# Patient Record
Sex: MALE | Race: WHITE | NOT HISPANIC OR LATINO | URBAN - METROPOLITAN AREA
[De-identification: names, ages, dates, MRNs, and addresses within clinical notes are randomized per-mention and may not be internally consistent; named-entity substitution may affect disease eponyms.]

---

## 2017-11-30 ENCOUNTER — ALLSCRIPTS OFFICE VISIT (OUTPATIENT)
Dept: OTHER | Facility: OTHER | Age: 57
End: 2017-11-30

## 2017-11-30 ENCOUNTER — TRANSCRIBE ORDERS (OUTPATIENT)
Dept: ADMINISTRATIVE | Facility: HOSPITAL | Age: 57
End: 2017-11-30

## 2017-11-30 DIAGNOSIS — R06.89 HYPOVENTILATION, IDIOPATHIC: Primary | ICD-10-CM

## 2017-11-30 DIAGNOSIS — R06.09 OTHER FORMS OF DYSPNEA: ICD-10-CM

## 2017-12-05 NOTE — CONSULTS
Assessment    1  Hyperlipidemia (272 4) (E78 5)   2  Palpitation (785 1) (R00 2)   3  Dyspnea on effort (786 09) (R06 09)    Plan  Dyspnea on effort    · ECHO COMPLETE WITH CONTRAST IF INDICATED; Status:Need Information - Financial  Authorization; Requested for:30Nov2017;    Perform:[facility]; [phone]; Ordered; For:Dyspnea on effort; Ordered By:Tim Gonzalez;   · STRESS TEST ONLY, EXERCISE; Status:Hold For - Scheduling; Requested  for:30Nov2017;    Perform:[facility]; [phone]; Ordered; For:Dyspnea on effort; Ordered By:Valeriy Gonzalez;   · Follow-up visit in 5 months Evaluation and Treatment  Follow-up  Status: Hold For -  Scheduling  Requested for: [id number]   Ordered; For: Dyspnea on effort; Ordered By: Leann Castorena Performed:  Due: [id number]    Discussion/Summary    1  FLORSE- this may be due to weight gain and deconditioning  FAther had bicuspid aortic valve likely due to AVR at 46 but he has no murmur of such  Regular stress test to evaluate for anginal equivalent     Chief Complaint  New pt  Pt came in today to rule out if he needs aortic heart valve replacement just like his father  Pt has sob when being active (running)  History of Present Illness  Cardiology HPI Free Text Note Form [facility]: 60 yo male presents presents for evaluation as his father had AVR in his 52's  He thinks it may have been due to bicuspid aortic valve  He has noticed SOB on some heavier exertion  He says this has gotten worse  He does run- usually around 3 miles  More recently he runs a mile and he is short of breath  He also does gymnastics  He did recently gain around 20 lbs- he says he should be under 200  He denies chest pressure  Review of Systems      Cardiac: No complaints of chest pain, no palpitations, no fainiting  Skin: No complaints of nonhealing sores or skin rash     Genitourinary: No complaints of recurrent urinary tract infections, frequent urination at night, difficult urination, blood in urine, kidney stones, loss of bladder control, no kidney or prostate problems, no erectile dysfunction  Psychological: No complaints of feeling depressed, anxiety, panic attacks, or difficulty concentrating  General: No complaints of trouble sleeping, lack of energy, fatigue, appetite changes, weight changes, fever, frequent infections, or night sweats  Respiratory: shortness of breath  HEENT: No complaints of serious problems, hearing problems, nose problems, throat problems, or snoring  Gastrointestinal: No complaints of liver problems, nausea, vomiting, heartburn, constipation, bloody stools, diarrhea, problems swallowing, adbominal pain, or rectal bleeding  Hematologic: No complaints of bleeding disorders, anemia, blood clots, or excessive brusing  Neurological: No complaints of numbness, tingling, dizziness, weakness, seizures, headaches, syncope or fainting, AM fatigue, daytime sleepiness, no witnessed apnea episodes  Musculoskeletal: No complaints of arthritis, back pain, or painfull swelling  Active Problems    1  Anxiety (300 00) (F41 9)   2  Elevated blood-pressure reading without diagnosis of hypertension (796 2) (R03 0)   3  H/o Lyme disease (V12 09) (Z86 19)   4  Hyperlipidemia (272 4) (E78 5)   5  Nephrolithiasis (592 0) (N20 0)   6  Vitamin D deficiency (268 9) (E55 9)    Past Medical History    · History of Elevated pancreatic enzyme (790 5) (R74 8)   · History of Encounter for screening for malignant neoplasm of colon (V76 51) (Z12 11)    The active problems and past medical history were reviewed and updated today  Surgical History    · History of Complex Repair Of Wound Chin   · History of Tonsillectomy    The surgical history was reviewed and updated today         Family History  Father    · Family history of Adenocarcinoma of prostate   · Family history of Heart valve replaced  Paternal Grandmother    · Family history of glaucoma (V19 11) (S76 431)  Paternal Grandfather    · Family history of cardiac disorder (V17 49) (Z82 49)   · Family history of diabetes mellitus (V18 0) (Z83 3)   · Family history of malignant neoplasm of breast (V16 3) (Z80 3)  Family History Reviewed: The family history was reviewed and updated today  Social History    ·    · No illicit drug use   · Non-smoker (V49 89) (Z78 9)   · Social alcohol use (Z78 9)  The social history was reviewed and updated today  Current Meds    The medication list was reviewed and updated today  Vitals  Signs    Heart Rate: 75  Systolic: 653, LUE, Sitting  Diastolic: 80, LUE, Sitting  Height: 5 ft 8 in  Weight: 218 lb   BMI Calculated: 33 15  BSA Calculated: 2 12  O2 Saturation: 98    Physical Exam    Constitutional   General appearance: No acute distress, well appearing and well nourished  Eyes   Conjunctiva and Sclera examination: Conjunctiva pink, sclera anicteric  Ears, Nose, Mouth, and Throat - Oropharynx: Clear, nares are clear, mucous membranes are moist    Neck   Neck and thyroid: Normal, supple, trachea midline, no thyromegaly  Pulmonary   Respiratory effort: No increased work of breathing or signs of respiratory distress  Auscultation of lungs: Clear to auscultation, no rales, no rhonchi, no wheezing, good air movement  Cardiovascular   Auscultation of heart: Normal rate and rhythm, normal S1 and S2, no murmurs  Carotid pulses: Normal, 2+ bilaterally  Peripheral vascular exam: Normal pulses throughout, no tenderness, erythema or swelling  Pedal pulses: Normal, 2+ bilaterally  Examination of extremities for edema and/or varicosities: Normal     Abdomen   Abdomen: Non-tender and no distention  Liver and spleen: No hepatomegaly or splenomegaly  Musculoskeletal Gait and station: Normal gait  Digits and nails: Normal without clubbing or cyanosis   Inspection/palpation of joints, bones, and muscles: Normal, ROM normal     Skin - Skin and subcutaneous tissue: Normal without rashes or lesions  Skin is warm and well perfused, normal turgor  Neurologic - Cranial nerves: II - XII intact   Speech: Normal     Psychiatric - Orientation to person, place, and time: Normal  Mood and affect: Normal       Results/Data    Lab Review: June 2017 labs- normal Cr, K, HgB   A 12 lead ECG was performed and was normal       Signatures   Electronically signed by : Bebe Wheeler DO; Nov 30 2017  4:53PM EST                       (Author)

## 2017-12-11 ENCOUNTER — HOSPITAL ENCOUNTER (OUTPATIENT)
Dept: NON INVASIVE DIAGNOSTICS | Facility: CLINIC | Age: 57
Discharge: HOME | End: 2017-12-11
Payer: COMMERCIAL

## 2017-12-11 ENCOUNTER — GENERIC CONVERSION - ENCOUNTER (OUTPATIENT)
Dept: CARDIOLOGY CLINIC | Facility: CLINIC | Age: 57
End: 2017-12-11

## 2017-12-11 DIAGNOSIS — R06.09 OTHER FORMS OF DYSPNEA: ICD-10-CM

## 2017-12-11 PROCEDURE — 93306 TTE W/DOPPLER COMPLETE: CPT

## 2017-12-11 PROCEDURE — 93017 CV STRESS TEST TRACING ONLY: CPT

## 2017-12-13 LAB
CHEST PAIN STATEMENT: NORMAL
MAX DIASTOLIC BP: 100 MMHG
MAX HEART RATE: 173 BPM
MAX PREDICTED HEART RATE: 162 BPM
MAX. SYSTOLIC BP: 210 MMHG
PROTOCOL NAME: NORMAL
REASON FOR TERMINATION: NORMAL
TARGET HR FORMULA: NORMAL
TEST INDICATION: NORMAL
TIME IN EXERCISE PHASE: NORMAL

## 2018-01-14 VITALS
HEART RATE: 75 BPM | SYSTOLIC BLOOD PRESSURE: 118 MMHG | BODY MASS INDEX: 33.04 KG/M2 | WEIGHT: 218 LBS | DIASTOLIC BLOOD PRESSURE: 80 MMHG | HEIGHT: 68 IN | OXYGEN SATURATION: 98 %

## 2018-03-22 ENCOUNTER — OFFICE VISIT (OUTPATIENT)
Dept: CARDIOLOGY CLINIC | Facility: CLINIC | Age: 58
End: 2018-03-22
Payer: COMMERCIAL

## 2018-03-22 VITALS
DIASTOLIC BLOOD PRESSURE: 82 MMHG | SYSTOLIC BLOOD PRESSURE: 128 MMHG | HEIGHT: 68 IN | WEIGHT: 213 LBS | BODY MASS INDEX: 32.28 KG/M2 | HEART RATE: 80 BPM | OXYGEN SATURATION: 98 %

## 2018-03-22 DIAGNOSIS — I10 HTN (HYPERTENSION), BENIGN: ICD-10-CM

## 2018-03-22 DIAGNOSIS — R07.9 CHEST PAIN, UNSPECIFIED TYPE: Primary | ICD-10-CM

## 2018-03-22 PROBLEM — R06.00 DYSPNEA ON EXERTION: Status: ACTIVE | Noted: 2018-03-22

## 2018-03-22 PROCEDURE — 93000 ELECTROCARDIOGRAM COMPLETE: CPT | Performed by: INTERNAL MEDICINE

## 2018-03-22 PROCEDURE — 99214 OFFICE O/P EST MOD 30 MIN: CPT | Performed by: INTERNAL MEDICINE

## 2018-03-22 NOTE — PROGRESS NOTES
Cardiology Outpatient Progress Note - Ana Contreras 60 y o  male MRN: [id number]    @ Encounter: [id number]      There are no active problems to display for this patient  Assessment:  1  FLORES- this may be due to weight gain and deconditioning  FAther had bicuspid aortic valve likely due to AVR at 45 but he has no murmur of such  12/11/17: normal stress test 12 minutes- SBP up to 210 mmHg  12/11/17: EF: 60%, grade 1 DD, trileaflet aortic valve  EKG today: NSR at 80 bpm   2  HTN response on stress test- 210/100 mmHg    TODAY'S PLAN:  His chest pressure may be related to HTN  Given his new exertional chest pressure, will get nuclear stress    HPI:   60 yo male presents presents for evaluation as his father had AVR in his 52's  He thinks it may have been due to bicuspid aortic valve  He has noticed SOB on some heavier exertion  He says this has gotten worse  He does run- usually around 3 miles  More recently he runs a mile and he is short of breath  He also does gymnastics  He did recently gain around 20 lbs- he says he should be under 200  He denies chest pressure  Interval History:    He is presenting today with chief complaint of chest pains  He noticed that he is getting more short of breath climbing the stairs  He also states that when he climbs stairs he gets some pressure  No past medical history on file  Review of Systems - Negative except chest pains as described  No Known Allergies    No current outpatient prescriptions on file  Social History     Social History    Marital status: Single     Spouse name: N/A    Number of children: N/A    Years of education: N/A     Occupational History    Not on file       Social History Main Topics    Smoking status: Never Smoker    Smokeless tobacco: Never Used    Alcohol use Not on file    Drug use: Unknown    Sexual activity: Not on file     Other Topics Concern    Not on file     Social History Narrative    No narrative on file Family Hx- no premature CAD    Physical Exam:    Vitals: Blood pressure 128/82, pulse 80, height 5' 8" (1 727 m), weight 96 6 kg (213 lb), SpO2 98 %  , Body mass index is 32 39 kg/m² ,   Wt Readings from Last 3 Encounters:   03/22/18 96 6 kg (213 lb)   11/30/17 98 9 kg (218 lb)       Physical Exam:    GEN: Dwaine Butt appears well, alert and oriented x 3, pleasant and cooperative   HEENT: pupils equal, round, and reactive to light; extraocular muscles intact  NECK: supple, no carotid bruits   HEART: regular rhythm, normal S1 and S2, no murmurs, clicks, gallops or rubs, JVP is  normal  LUNGS: clear to auscultation bilaterally; no wheezes, rales, or rhonchi   ABDOMEN: normal bowel sounds, soft, no tenderness, no distention  EXTREMITIES: peripheral pulses normal; no clubbing, cyanosis, or edema  NEURO: no focal findings   SKIN: normal without suspicious lesions on exposed skin    Labs & Results:    No results found for: GLUCOSE, CALCIUM, NA, K, CO2, CL, BUN, CREATININE  No results found for: WBC, HGB, HCT, MCV, PLT  No results found for: BNP   No results found for: CHOL  No results found for: HDL  No results found for: LDLCALC  No results found for: TRIG  No components found for: CHOLHDL      EKG personally reviewed by Michael Santamaria DO  Counseling / Coordination of Care  Total floor / unit time spent today 25 minutes  Greater than 50% of total time was spent with the patient and / or family counseling and / or coordination of care  A description of the counseling / coordination of care: 15  Thank you for the opportunity to participate in the care of this patient    FELICIANO BOWER 29 [facility] [place]

## 2018-03-26 ENCOUNTER — APPOINTMENT (OUTPATIENT)
Dept: LAB | Age: 58
End: 2018-03-26
Payer: COMMERCIAL

## 2018-03-26 ENCOUNTER — OFFICE VISIT (OUTPATIENT)
Dept: CARDIOLOGY CLINIC | Facility: CLINIC | Age: 58
End: 2018-03-26

## 2018-03-26 ENCOUNTER — HOSPITAL ENCOUNTER (OUTPATIENT)
Dept: NON INVASIVE DIAGNOSTICS | Facility: CLINIC | Age: 58
Discharge: HOME | End: 2018-03-26
Payer: COMMERCIAL

## 2018-03-26 ENCOUNTER — TRANSCRIBE ORDERS (OUTPATIENT)
Dept: ADMINISTRATIVE | Age: 58
End: 2018-03-26

## 2018-03-26 VITALS
BODY MASS INDEX: 32.28 KG/M2 | HEIGHT: 68 IN | WEIGHT: 213 LBS | HEART RATE: 80 BPM | SYSTOLIC BLOOD PRESSURE: 120 MMHG | DIASTOLIC BLOOD PRESSURE: 76 MMHG

## 2018-03-26 DIAGNOSIS — R94.39 ABNORMAL CARDIOVASCULAR STRESS TEST: Primary | ICD-10-CM

## 2018-03-26 DIAGNOSIS — I10 HTN (HYPERTENSION), BENIGN: ICD-10-CM

## 2018-03-26 DIAGNOSIS — R07.9 CHEST PAIN, UNSPECIFIED TYPE: ICD-10-CM

## 2018-03-26 DIAGNOSIS — I10 ESSENTIAL HYPERTENSION, MALIGNANT: Primary | ICD-10-CM

## 2018-03-26 DIAGNOSIS — I10 ESSENTIAL HYPERTENSION: Primary | ICD-10-CM

## 2018-03-26 DIAGNOSIS — I10 HYPERTENSION, UNSPECIFIED TYPE: ICD-10-CM

## 2018-03-26 DIAGNOSIS — I10 ESSENTIAL HYPERTENSION, MALIGNANT: ICD-10-CM

## 2018-03-26 LAB
ANION GAP SERPL CALCULATED.3IONS-SCNC: 4 MMOL/L (ref 4–13)
BUN SERPL-MCNC: 12 MG/DL (ref 5–25)
CALCIUM SERPL-MCNC: 9.2 MG/DL (ref 8.3–10.1)
CHLORIDE SERPL-SCNC: 107 MMOL/L (ref 100–108)
CO2 SERPL-SCNC: 28 MMOL/L (ref 21–32)
CREAT SERPL-MCNC: 0.83 MG/DL (ref 0.6–1.3)
GFR SERPL CREATININE-BSD FRML MDRD: 96 ML/MIN/1.73SQ M
GLUCOSE SERPL-MCNC: 133 MG/DL (ref 65–140)
MAX DIASTOLIC BP: 100 MMHG
MAX HEART RATE: 144 BPM
MAX PREDICTED HEART RATE: 161 BPM
MAX. SYSTOLIC BP: 192 MMHG
POTASSIUM SERPL-SCNC: 3.9 MMOL/L (ref 3.5–5.3)
PROTOCOL NAME: NORMAL
REASON FOR TERMINATION: NORMAL
SODIUM SERPL-SCNC: 139 MMOL/L (ref 136–145)
TARGET HR FORMULA: NORMAL
TEST INDICATION: NORMAL
TIME IN EXERCISE PHASE: NORMAL

## 2018-03-26 PROCEDURE — 78452 HT MUSCLE IMAGE SPECT MULT: CPT

## 2018-03-26 PROCEDURE — 99213 OFFICE O/P EST LOW 20 MIN: CPT | Performed by: INTERNAL MEDICINE

## 2018-03-26 PROCEDURE — A9502 TC99M TETROFOSMIN: HCPCS

## 2018-03-26 PROCEDURE — 93017 CV STRESS TEST TRACING ONLY: CPT

## 2018-03-26 PROCEDURE — 80048 BASIC METABOLIC PNL TOTAL CA: CPT

## 2018-03-26 PROCEDURE — 36415 COLL VENOUS BLD VENIPUNCTURE: CPT

## 2018-03-26 NOTE — PROGRESS NOTES
Cardiology Outpatient Progress Note - Francisco Driver 60 y o  male MRN: [id number]    @ Encounter: [id number]      Patient Active Problem List    Diagnosis Date Noted    HTN (hypertension), benign 03/22/2018    Dyspnea on exertion 03/22/2018       Assessment:  1  Abnormal nuclear stress test with ST depression and reversible perfusion defect in anteroseptum today 3/26/18  12/11/17: normal stress test 12 minutes- SBP up to 210 mmHg  12/11/17: EF: 60%, grade 1 DD, trileaflet aortic valve  2  HTN response on stress test- 210/100 mmHg    TODAY'S PLAN:  [facility] given his exertional symptoms and positive nuclear stress test  Interesting given his recent stress test Dec doing 12 minutes with no ischemic changes  HPI:   60 yo male presents presents for evaluation as his father had AVR in his 52's  He thinks it may have been due to bicuspid aortic valve  He has noticed SOB on some heavier exertion  He says this has gotten worse  He does run- usually around 3 miles  More recently he runs a mile and he is short of breath  He also does gymnastics  He did recently gain around 20 lbs- he says he should be under 200  He followed up 3/22 for exertional chest pressure  Interval History:   I am seeing him today with his stress test today and showed ST depression and reversible perfusion defect in the septum, anterior wall  No past medical history on file  Review of Systems - Negative except exertional chest pressure    No Known Allergies    No current outpatient prescriptions on file  Social History     Social History    Marital status: Single     Spouse name: N/A    Number of children: N/A    Years of education: N/A     Occupational History    Not on file       Social History Main Topics    Smoking status: Never Smoker    Smokeless tobacco: Never Used    Alcohol use 0 6 - 1 2 oz/week     1 - 2 Glasses of wine per week      Comment: occasional    Drug use: No    Sexual activity: Not on file     Other Topics Concern    Not on file     Social History Narrative    No narrative on file       No family history on file  Physical Exam:    Vitals: Blood pressure 120/76, pulse 80, height 5' 8" (1 727 m), weight 96 6 kg (213 lb)  , Body mass index is 32 39 kg/m² ,   Wt Readings from Last 3 Encounters:   03/26/18 96 6 kg (213 lb)   03/26/18 96 6 kg (213 lb)   03/22/18 96 6 kg (213 lb)         Physical Exam:    GEN: Jay Najera appears well, alert and oriented x 3, pleasant and cooperative   HEENT: pupils equal, round, and reactive to light; extraocular muscles intact  NECK: supple, no carotid bruits   HEART: regular rhythm, normal S1 and S2, no murmurs, clicks, gallops or rubs, JVP is    LUNGS: clear to auscultation bilaterally; no wheezes, rales, or rhonchi   ABDOMEN: normal bowel sounds, soft, no tenderness, no distention  EXTREMITIES: peripheral pulses normal; no clubbing, cyanosis, or edema  NEURO: no focal findings   SKIN: normal without suspicious lesions on exposed skin    Labs & Results:    No results found for: GLUCOSE, CALCIUM, NA, K, CO2, CL, BUN, CREATININE  No results found for: WBC, HGB, HCT, MCV, PLT  No results found for: BNP   No results found for: CHOL  No results found for: HDL  No results found for: LDLCALC  No results found for: TRIG  No components found for: CHOLHDL      EKG personally reviewed by James Morris DO  Counseling / Coordination of Care  Total floor / unit time spent today 25 minutes  Greater than 50% of total time was spent with the patient and / or family counseling and / or coordination of care  A description of the counseling / coordination of care: 15  Thank you for the opportunity to participate in the care of this patient    FELICIANO BOWER 29 [address] [place]

## 2018-03-28 PROBLEM — Z12.11 SCREENING FOR COLON CANCER: Status: ACTIVE | Noted: 2018-03-28

## 2018-03-28 RX ORDER — SODIUM CHLORIDE 9 MG/ML
125 INJECTION, SOLUTION INTRAVENOUS CONTINUOUS
Status: CANCELLED | OUTPATIENT
Start: 2018-03-28

## 2018-03-28 RX ORDER — ASPIRIN 81 MG/1
324 TABLET, CHEWABLE ORAL ONCE
Status: CANCELLED | OUTPATIENT
Start: 2018-03-28 | End: 2018-03-28

## 2018-03-28 NOTE — H&P
H&P Exam - Cardiology   Marie Gan 60 y o  male MRN: [id number]  Unit/Bed#:  Encounter: [id number]      Assessment  1  Exertional angina  2  Abnormal exercise SPECT        Plan    Maria L El will undergo an elective coronary angiography in view of findings of large area of LAD territory ischemia on recent exercise SPECT testing  No piror hx of CAD  Risks and benefits of cardiac catheterization were explained to patient  Informed consent was obtained and placed in the chart  History of Present Illness   HPI:  Marie Gan is a 60 y o  male who presents for an elective coronary angiography following an abnormal exercise stress test    He has experienced exertional angina and dyspnea  + recent weight gain  Hypertensive response during stress test          Historical Information     PMH -CAD, - HTN, -DM    History   Alcohol Use    0 6 - 1 2 oz/week    1 - 2 Glasses of wine per week     Comment: occasional     History   Drug Use No     History   Smoking Status    Never Smoker   Smokeless Tobacco    Never Used     Family History: No family history on file  Meds/Allergies     No Known Allergies    Objective   Vitals: There were no vitals taken for this visit  Review of Systems: Pertinent + are in H+P    Physical Exam   Constitutional: He is oriented to person, place, and time  He appears well-developed  HENT:   Head: Normocephalic  Eyes: Pupils are equal, round, and reactive to light  Neck: No JVD present  Cardiovascular: Normal rate, regular rhythm, normal heart sounds and intact distal pulses  Pulmonary/Chest: Effort normal and breath sounds normal    Abdominal: Soft  Bowel sounds are normal    Neurological: He is alert and oriented to person, place, and time  Skin: Skin is warm  Lab Results: I have personally reviewed pertinent lab results  Imaging: I have personally reviewed pertinent reports

## 2018-03-29 ENCOUNTER — HOSPITAL ENCOUNTER (OUTPATIENT)
Dept: NON INVASIVE DIAGNOSTICS | Facility: HOSPITAL | Age: 58
Discharge: HOME | End: 2018-03-30
Attending: INTERNAL MEDICINE | Admitting: INTERNAL MEDICINE
Payer: COMMERCIAL

## 2018-03-29 DIAGNOSIS — R94.39 ABNORMAL CARDIOVASCULAR STRESS TEST: ICD-10-CM

## 2018-03-29 DIAGNOSIS — I25.10 CAD (CORONARY ARTERY DISEASE): Primary | ICD-10-CM

## 2018-03-29 LAB
ANION GAP SERPL CALCULATED.3IONS-SCNC: 6 MMOL/L (ref 4–13)
APTT PPP: 31 SECONDS (ref 23–35)
ATRIAL RATE: 69 BPM
BUN SERPL-MCNC: 10 MG/DL (ref 5–25)
CALCIUM SERPL-MCNC: 9.4 MG/DL (ref 8.3–10.1)
CHLORIDE SERPL-SCNC: 110 MMOL/L (ref 100–108)
CHOLEST SERPL-MCNC: 210 MG/DL (ref 50–200)
CHOLEST SERPL-MCNC: 219 MG/DL (ref 50–200)
CO2 SERPL-SCNC: 25 MMOL/L (ref 21–32)
CREAT SERPL-MCNC: 0.89 MG/DL (ref 0.6–1.3)
ERYTHROCYTE [DISTWIDTH] IN BLOOD BY AUTOMATED COUNT: 13.2 % (ref 11.6–15.1)
GFR SERPL CREATININE-BSD FRML MDRD: 94 ML/MIN/1.73SQ M
GLUCOSE P FAST SERPL-MCNC: 97 MG/DL (ref 65–99)
GLUCOSE SERPL-MCNC: 97 MG/DL (ref 65–140)
HCT VFR BLD AUTO: 45.9 % (ref 36.5–49.3)
HDLC SERPL-MCNC: 36 MG/DL (ref 40–60)
HDLC SERPL-MCNC: 38 MG/DL (ref 40–60)
HGB BLD-MCNC: 16.3 G/DL (ref 12–17)
INR PPP: 0.95 (ref 0.86–1.16)
KCT BLD-ACNC: 278 SEC (ref 89–137)
LDLC SERPL CALC-MCNC: 141 MG/DL (ref 0–100)
LDLC SERPL CALC-MCNC: 148 MG/DL (ref 0–100)
MCH RBC QN AUTO: 30.5 PG (ref 26.8–34.3)
MCHC RBC AUTO-ENTMCNC: 35.5 G/DL (ref 31.4–37.4)
MCV RBC AUTO: 86 FL (ref 82–98)
P AXIS: 14 DEGREES
PLATELET # BLD AUTO: 196 THOUSANDS/UL (ref 149–390)
PMV BLD AUTO: 9.5 FL (ref 8.9–12.7)
POTASSIUM SERPL-SCNC: 3.8 MMOL/L (ref 3.5–5.3)
PR INTERVAL: 172 MS
PROTHROMBIN TIME: 12.7 SECONDS (ref 12.1–14.4)
QRS AXIS: 12 DEGREES
QRSD INTERVAL: 92 MS
QT INTERVAL: 386 MS
QTC INTERVAL: 413 MS
RBC # BLD AUTO: 5.34 MILLION/UL (ref 3.88–5.62)
SODIUM SERPL-SCNC: 141 MMOL/L (ref 136–145)
SPECIMEN SOURCE: ABNORMAL
T WAVE AXIS: 37 DEGREES
TRIGL SERPL-MCNC: 163 MG/DL
TRIGL SERPL-MCNC: 167 MG/DL
VENTRICULAR RATE: 69 BPM
WBC # BLD AUTO: 6.16 THOUSAND/UL (ref 4.31–10.16)

## 2018-03-29 PROCEDURE — C1725 CATH, TRANSLUMIN NON-LASER: HCPCS | Performed by: INTERNAL MEDICINE

## 2018-03-29 PROCEDURE — 85730 THROMBOPLASTIN TIME PARTIAL: CPT | Performed by: INTERNAL MEDICINE

## 2018-03-29 PROCEDURE — C9600 PERC DRUG-EL COR STENT SING: HCPCS | Performed by: INTERNAL MEDICINE

## 2018-03-29 PROCEDURE — C1769 GUIDE WIRE: HCPCS | Performed by: INTERNAL MEDICINE

## 2018-03-29 PROCEDURE — 92928 PRQ TCAT PLMT NTRAC ST 1 LES: CPT | Performed by: INTERNAL MEDICINE

## 2018-03-29 PROCEDURE — C1894 INTRO/SHEATH, NON-LASER: HCPCS | Performed by: INTERNAL MEDICINE

## 2018-03-29 PROCEDURE — C1887 CATHETER, GUIDING: HCPCS | Performed by: INTERNAL MEDICINE

## 2018-03-29 PROCEDURE — 93005 ELECTROCARDIOGRAM TRACING: CPT | Performed by: INTERNAL MEDICINE

## 2018-03-29 PROCEDURE — C1874 STENT, COATED/COV W/DEL SYS: HCPCS

## 2018-03-29 PROCEDURE — 85027 COMPLETE CBC AUTOMATED: CPT | Performed by: INTERNAL MEDICINE

## 2018-03-29 PROCEDURE — 99153 MOD SED SAME PHYS/QHP EA: CPT | Performed by: INTERNAL MEDICINE

## 2018-03-29 PROCEDURE — 80048 BASIC METABOLIC PNL TOTAL CA: CPT | Performed by: INTERNAL MEDICINE

## 2018-03-29 PROCEDURE — 85610 PROTHROMBIN TIME: CPT | Performed by: INTERNAL MEDICINE

## 2018-03-29 PROCEDURE — 80061 LIPID PANEL: CPT | Performed by: INTERNAL MEDICINE

## 2018-03-29 PROCEDURE — 93458 L HRT ARTERY/VENTRICLE ANGIO: CPT | Performed by: INTERNAL MEDICINE

## 2018-03-29 PROCEDURE — 99152 MOD SED SAME PHYS/QHP 5/>YRS: CPT | Performed by: INTERNAL MEDICINE

## 2018-03-29 PROCEDURE — 85347 COAGULATION TIME ACTIVATED: CPT

## 2018-03-29 RX ORDER — FENTANYL CITRATE 50 UG/ML
INJECTION, SOLUTION INTRAMUSCULAR; INTRAVENOUS CODE/TRAUMA/SEDATION MEDICATION
Status: COMPLETED | OUTPATIENT
Start: 2018-03-29 | End: 2018-03-29

## 2018-03-29 RX ORDER — HEPARIN SODIUM 1000 [USP'U]/ML
INJECTION, SOLUTION INTRAVENOUS; SUBCUTANEOUS CODE/TRAUMA/SEDATION MEDICATION
Status: COMPLETED | OUTPATIENT
Start: 2018-03-29 | End: 2018-03-29

## 2018-03-29 RX ORDER — LIDOCAINE HYDROCHLORIDE 10 MG/ML
INJECTION, SOLUTION INFILTRATION; PERINEURAL CODE/TRAUMA/SEDATION MEDICATION
Status: COMPLETED | OUTPATIENT
Start: 2018-03-29 | End: 2018-03-29

## 2018-03-29 RX ORDER — SODIUM CHLORIDE 9 MG/ML
100 INJECTION, SOLUTION INTRAVENOUS CONTINUOUS
Status: DISPENSED | OUTPATIENT
Start: 2018-03-29 | End: 2018-03-29

## 2018-03-29 RX ORDER — ASPIRIN 81 MG/1
324 TABLET, CHEWABLE ORAL ONCE
Status: COMPLETED | OUTPATIENT
Start: 2018-03-29 | End: 2018-03-29

## 2018-03-29 RX ORDER — CLOPIDOGREL BISULFATE 75 MG/1
300 TABLET ORAL ONCE
Status: COMPLETED | OUTPATIENT
Start: 2018-03-29 | End: 2018-03-29

## 2018-03-29 RX ORDER — ASPIRIN 81 MG/1
81 TABLET, CHEWABLE ORAL DAILY
Status: DISCONTINUED | OUTPATIENT
Start: 2018-03-30 | End: 2018-03-30 | Stop reason: HOSPADM

## 2018-03-29 RX ORDER — VERAPAMIL HYDROCHLORIDE 2.5 MG/ML
INJECTION, SOLUTION INTRAVENOUS CODE/TRAUMA/SEDATION MEDICATION
Status: COMPLETED | OUTPATIENT
Start: 2018-03-29 | End: 2018-03-29

## 2018-03-29 RX ORDER — MIDAZOLAM HYDROCHLORIDE 1 MG/ML
INJECTION INTRAMUSCULAR; INTRAVENOUS CODE/TRAUMA/SEDATION MEDICATION
Status: COMPLETED | OUTPATIENT
Start: 2018-03-29 | End: 2018-03-29

## 2018-03-29 RX ORDER — NITROGLYCERIN 20 MG/100ML
INJECTION INTRAVENOUS CODE/TRAUMA/SEDATION MEDICATION
Status: COMPLETED | OUTPATIENT
Start: 2018-03-29 | End: 2018-03-29

## 2018-03-29 RX ORDER — ATORVASTATIN CALCIUM 40 MG/1
40 TABLET, FILM COATED ORAL
Status: DISCONTINUED | OUTPATIENT
Start: 2018-03-29 | End: 2018-03-30 | Stop reason: HOSPADM

## 2018-03-29 RX ORDER — CLOPIDOGREL BISULFATE 75 MG/1
75 TABLET ORAL DAILY
Status: DISCONTINUED | OUTPATIENT
Start: 2018-03-30 | End: 2018-03-30 | Stop reason: HOSPADM

## 2018-03-29 RX ORDER — SODIUM CHLORIDE 9 MG/ML
125 INJECTION, SOLUTION INTRAVENOUS CONTINUOUS
Status: DISCONTINUED | OUTPATIENT
Start: 2018-03-29 | End: 2018-03-29

## 2018-03-29 RX ADMIN — IOHEXOL 140 ML: 350 INJECTION, SOLUTION INTRAVENOUS at 10:57

## 2018-03-29 RX ADMIN — CLOPIDOGREL BISULFATE 300 MG: 75 TABLET ORAL at 21:22

## 2018-03-29 RX ADMIN — NITROGLYCERIN 200 MCG: 20 INJECTION INTRAVENOUS at 10:35

## 2018-03-29 RX ADMIN — LIDOCAINE HYDROCHLORIDE 1 ML: 10 INJECTION, SOLUTION INFILTRATION; PERINEURAL at 10:21

## 2018-03-29 RX ADMIN — FENTANYL CITRATE 50 MCG: 50 INJECTION, SOLUTION INTRAMUSCULAR; INTRAVENOUS at 10:16

## 2018-03-29 RX ADMIN — TICAGRELOR 180 MG: 90 TABLET ORAL at 10:31

## 2018-03-29 RX ADMIN — FENTANYL CITRATE 50 MCG: 50 INJECTION, SOLUTION INTRAMUSCULAR; INTRAVENOUS at 10:22

## 2018-03-29 RX ADMIN — HEPARIN SODIUM 4000 UNITS: 1000 INJECTION INTRAVENOUS; SUBCUTANEOUS at 10:22

## 2018-03-29 RX ADMIN — ATORVASTATIN CALCIUM 40 MG: 40 TABLET, FILM COATED ORAL at 17:50

## 2018-03-29 RX ADMIN — MIDAZOLAM 1 MG: 1 INJECTION INTRAMUSCULAR; INTRAVENOUS at 10:37

## 2018-03-29 RX ADMIN — FENTANYL CITRATE 50 MCG: 50 INJECTION, SOLUTION INTRAMUSCULAR; INTRAVENOUS at 10:37

## 2018-03-29 RX ADMIN — METOPROLOL TARTRATE 25 MG: 25 TABLET ORAL at 21:22

## 2018-03-29 RX ADMIN — HEPARIN SODIUM 2500 UNITS: 1000 INJECTION INTRAVENOUS; SUBCUTANEOUS at 10:44

## 2018-03-29 RX ADMIN — ASPIRIN 81 MG 324 MG: 81 TABLET ORAL at 08:48

## 2018-03-29 RX ADMIN — MIDAZOLAM 1 MG: 1 INJECTION INTRAMUSCULAR; INTRAVENOUS at 10:22

## 2018-03-29 RX ADMIN — HEPARIN SODIUM 6000 UNITS: 1000 INJECTION INTRAVENOUS; SUBCUTANEOUS at 10:31

## 2018-03-29 RX ADMIN — VERAPAMIL HYDROCHLORIDE 2.5 MG: 2.5 INJECTION INTRAVENOUS at 10:35

## 2018-03-29 RX ADMIN — VERAPAMIL HYDROCHLORIDE 2.5 MG: 2.5 INJECTION INTRAVENOUS at 10:22

## 2018-03-29 RX ADMIN — MIDAZOLAM 2 MG: 1 INJECTION INTRAMUSCULAR; INTRAVENOUS at 10:16

## 2018-03-29 RX ADMIN — NITROGLYCERIN 200 MCG: 20 INJECTION INTRAVENOUS at 10:22

## 2018-03-29 RX ADMIN — SODIUM CHLORIDE 125 ML/HR: 0.9 INJECTION, SOLUTION INTRAVENOUS at 08:30

## 2018-03-30 VITALS
DIASTOLIC BLOOD PRESSURE: 83 MMHG | TEMPERATURE: 98 F | HEART RATE: 69 BPM | SYSTOLIC BLOOD PRESSURE: 144 MMHG | RESPIRATION RATE: 18 BRPM | OXYGEN SATURATION: 96 %

## 2018-03-30 PROBLEM — I25.10 CORONARY ARTERY DISEASE INVOLVING NATIVE CORONARY ARTERY: Status: ACTIVE | Noted: 2018-03-30

## 2018-03-30 LAB
ANION GAP SERPL CALCULATED.3IONS-SCNC: 6 MMOL/L (ref 4–13)
BUN SERPL-MCNC: 11 MG/DL (ref 5–25)
CALCIUM SERPL-MCNC: 8.7 MG/DL (ref 8.3–10.1)
CHLORIDE SERPL-SCNC: 110 MMOL/L (ref 100–108)
CO2 SERPL-SCNC: 26 MMOL/L (ref 21–32)
CREAT SERPL-MCNC: 0.84 MG/DL (ref 0.6–1.3)
GFR SERPL CREATININE-BSD FRML MDRD: 96 ML/MIN/1.73SQ M
GLUCOSE SERPL-MCNC: 85 MG/DL (ref 65–140)
POTASSIUM SERPL-SCNC: 4.1 MMOL/L (ref 3.5–5.3)
SODIUM SERPL-SCNC: 142 MMOL/L (ref 136–145)

## 2018-03-30 PROCEDURE — 80048 BASIC METABOLIC PNL TOTAL CA: CPT | Performed by: INTERNAL MEDICINE

## 2018-03-30 PROCEDURE — 99213 OFFICE O/P EST LOW 20 MIN: CPT | Performed by: INTERNAL MEDICINE

## 2018-03-30 RX ORDER — ASPIRIN 81 MG/1
81 TABLET, CHEWABLE ORAL DAILY
Refills: 0
Start: 2018-03-31

## 2018-03-30 RX ORDER — CLOPIDOGREL BISULFATE 75 MG/1
75 TABLET ORAL DAILY
Qty: 30 TABLET | Refills: 6 | Status: SHIPPED | OUTPATIENT
Start: 2018-03-31 | End: 2018-04-20 | Stop reason: SDUPTHER

## 2018-03-30 RX ORDER — ATORVASTATIN CALCIUM 40 MG/1
40 TABLET, FILM COATED ORAL
Qty: 30 TABLET | Refills: 6 | Status: SHIPPED | OUTPATIENT
Start: 2018-03-30 | End: 2018-04-20 | Stop reason: SDUPTHER

## 2018-03-30 RX ORDER — NITROGLYCERIN 0.4 MG/1
0.4 TABLET SUBLINGUAL
Qty: 90 TABLET | Refills: 6 | Status: SHIPPED | OUTPATIENT
Start: 2018-03-30

## 2018-03-30 RX ORDER — NITROGLYCERIN 0.4 MG/1
0.4 TABLET SUBLINGUAL
Status: DISCONTINUED | OUTPATIENT
Start: 2018-03-30 | End: 2018-03-30 | Stop reason: HOSPADM

## 2018-03-30 RX ADMIN — METOPROLOL TARTRATE 25 MG: 25 TABLET ORAL at 08:36

## 2018-03-30 RX ADMIN — ASPIRIN 81 MG 81 MG: 81 TABLET ORAL at 08:37

## 2018-03-30 RX ADMIN — CLOPIDOGREL BISULFATE 75 MG: 75 TABLET ORAL at 08:37

## 2018-03-30 NOTE — PROGRESS NOTES
Cardiology Progress Note - Marie Gan 60 y o  male MRN: [id number]    Unit/Bed#: CW2 216-02 Encounter: [id number]      Assessment/Plan:  Exertional angina with abnormal stress test-status post cardiac catheterization and PCI to proximal LAD with drug-eluting stent- continue aspirin 81 mg daily, Plavix 75 mg daily and metoprolol 25 mg q 12 hours  Hyperlipidemia- continue it at atorvastatin 40 mg daily and TLC diet  Discussed dietary habits and encourage exercise activity  Subjective:   Patient seen and examined  No significant events overnight  Denies chest pain, and, orthopnea, denies abdominal pain, nausea    Objective:     Vitals: Blood pressure 123/69, pulse 71, temperature 98 °F (36 7 °C), temperature source Oral, resp  rate 18, SpO2 93 %  , There is no height or weight on file to calculate BMI ,    Intake/Output Summary (Last 24 hours) at 03/30/18 0942  Last data filed at 03/30/18 0841   Gross per 24 hour   Intake           785 42 ml   Output                0 ml   Net           785 42 ml         Physical Exam   Constitutional: He is oriented to person, place, and time  He appears well-developed and well-nourished  HENT:   Head: Normocephalic and atraumatic  Neck: Neck supple  Cardiovascular: Normal rate, regular rhythm, normal heart sounds and intact distal pulses  Pulmonary/Chest: Effort normal and breath sounds normal    Abdominal: Soft  Bowel sounds are normal    Neurological: He is alert and oriented to person, place, and time  Skin: Skin is dry  Psychiatric: His behavior is normal     Right Radial artery has  + 2 pulse with brisk capillary refill  Neurovascular intact to  Right hand        Current Facility-Administered Medications:     aspirin chewable tablet 81 mg, 81 mg, Oral, Daily, Shaina Lee MD, 81 mg at 03/30/18 0837    atorvastatin (LIPITOR) tablet 40 mg, 40 mg, Oral, Daily With Aki Menezes MD, 40 mg at 03/29/18 1750    clopidogrel (PLAVIX) tablet 75 mg, 75 mg, Oral, Daily, Mitchel Garza MD, 75 mg at 03/30/18 0837    metoprolol tartrate (LOPRESSOR) tablet 25 mg, 25 mg, Oral, Q12H [facility], Mitchel Garza MD, 25 mg at 03/30/18 0836    No results found for: CKTOTAL, CKMB, CKMBINDEX, TROPONINI    Lab Results   Component Value Date    GLUCOSE 85 03/30/2018    CALCIUM 8 7 03/30/2018     03/30/2018    K 4 1 03/30/2018    CO2 26 03/30/2018     (H) 03/30/2018    BUN 11 03/30/2018    CREATININE 0 84 03/30/2018       Lab Results   Component Value Date    WBC 6 16 03/29/2018    HGB 16 3 03/29/2018    HCT 45 9 03/29/2018    MCV 86 03/29/2018     03/29/2018       Results from last 7 days  Lab Units 03/29/18  0847   INR  0 95       Lab Results   Component Value Date    CHOL 210 (H) 03/29/2018    CHOL 219 (H) 03/29/2018     Lab Results   Component Value Date    HDL 36 (L) 03/29/2018    HDL 38 (L) 03/29/2018     Lab Results   Component Value Date    LDLCALC 141 (H) 03/29/2018    LDLCALC 148 (H) 03/29/2018     Lab Results   Component Value Date    TRIG 167 (H) 03/29/2018    TRIG 163 (H) 03/29/2018         Cath: The coronary circulation is right dominant  --  Proximal LAD: There was a 90 % stenosis  --  Mid LAD: There was a diffuse 30 % stenosis  --  Circumflex: Angiography showed minor luminal irregularities  --  RCA: Angiography showed minor luminal irregularities  PCI:  PCI pLAD with MEHDI    Tele:  Sinus rhythm      Counseling / Coordination of Care  Total time spent today 30 minutes  Greater than 50% of total time was spent with the patient and / or family counseling and / or coordination of care  Patient Was Seen By: JOSSIE Oakley, Acute Care Nurse Practitioner      ** Please Note: Fluency Direct Dictation voice to text software may have been used in the creation of this document   **

## 2018-03-30 NOTE — DISCHARGE SUMMARY
Discharge Summary - Yfn Boss 60 y o  male MRN: [id number]        Admission Date: 3/29/2018     Date of Discharge:  03/30/2018    Admitting Diagnosis: Abnormal cardiovascular stress test [R94 39]    Secondary Diagnoses:   Hyperlipidemia    Discharge Diagnosis:  Coronary artery disease status post PCI and drug-eluting stent to proximal LAD    Cardiologist:  Dr Indira Ramos Cardiologist Dr Tiago Ruff    PCP: Cresencio Camejo, [address] [place] [address] Course: Yfn Boss is a 60 y o  male who was electively admitted coronary angiography following an abnormal exercise stress test    He has experienced exertional angina and dyspnea  + recent weight gain  Hypertensive response during stress test      The results of his his catheterization are as follows: Yfn Boss is a 60 y o  male who presents for an elective coronary angiography following an abnormal exercise stress test    He has experienced exertional angina and dyspnea  + recent weight gain  Hypertensive response during stress test   Results with of his catheterization are as follows: The coronary circulation is right dominant  --  Proximal LAD: There was a 90 % stenosis  --  Mid LAD: There was a diffuse 30 % stenosis  --  Circumflex: Angiography showed minor luminal irregularities  --  RCA: Angiography showed minor luminal irregularities  Patient underwent a PCI and drug-eluting stent to the mid proximal LAD  Patient require 1 year of DAPT, aspirin 81 mg daily and Plavix 75 mg daily  We have added metoprolol 25 mg q 12 hours and Lipitor 40 mg daily  Patient was given detailed instructions in his activity level and home dietary restrictions  Also encouraged him to start exercising and established at least a 10-20% weight loss  Patient will follow up with Dr Tiago Ruff in mid April and are [facility] office        Procedures Performed:   Orders Placed This Encounter   Procedures    Cardiac catheterization Complications:  None    Discharge instructions/Information to patient and family:   See after visit summary for information provided to patient and family  Provisions for Follow-Up Care:  Dr Glory Barrera 20 April at 15:00 the [facility]      Disposition: Home      Planned Readmission: No    Discharge Statement   I spent 60 minutes discharging the patient  This time was spent on the day of discharge  I had direct contact with the patient on the day of discharge  Additional documentation is required if more than 30 minutes were spent on discharge  Discharge Medications:  Please refer to medication discharge record for current medications         Dictated By: JOSSIE Cutler, Acute Care Nurse Practitioner    ** Please Note: Fluency Direct Dictation voice to text software may have been used in the creation of this document   **

## 2018-03-30 NOTE — PLAN OF CARE
Problem: PAIN - ADULT  Goal: Verbalizes/displays adequate comfort level or baseline comfort level  Interventions:  - Encourage patient to monitor pain and request assistance  - Assess pain using appropriate pain scale  - Administer analgesics based on type and severity of pain and evaluate response  - Implement non-pharmacological measures as appropriate and evaluate response  - Consider cultural and social influences on pain and pain management  - Notify physician/advanced practitioner if interventions unsuccessful or patient reports new pain  Outcome: Progressing  Denies any pain

## 2018-03-30 NOTE — DISCHARGE INSTRUCTIONS
1  Please see the post angioplasty discharge instructions  No heavy lifting, greater than 10 lbs  or strenuous activity for 1 week  Follow angioplasty discharge instructions  2 Remove band aid tomorrow  Shower and wash area- wrist gently with soap and water- beginning tomorrow  Rinse and pat dry  Apply new water seal band aid  Repeat this process for 5 days  No powders, creams lotions or antibiotic ointments  for 5 days  No tub baths, hot tubs or swimming for 5 days  3  Call [facility] ([phone]) if you develop a fever, redness or drainage at your wrist access site  4  No driving for 2 days    5  Do not stop aspirin or Plavix (clopiogrel) any reason without a cardiologists consent, or the stent could block up and cause a heart attack  6  Stent card and book  Coronary Artery Disease   AMBULATORY CARE:   Coronary artery disease (CAD)  is narrowing of the arteries to your heart caused by a buildup of plaque  Plaque is made up of cholesterol and other substances  The narrowing in your arteries decreases the amount of blood that can flow to your heart  This causes your heart to get less oxygen  You may not have any symptoms of CAD  It is important for you to manage CAD even if you feel well  CAD can lead to a heart attack if it is not managed         Common symptoms include the following:   · Chest pain (angina), causing burning, squeezing, or crushing tightness in your chest    · Pain that spreads to your neck, jaw, or shoulder blade    · Nausea, vomiting, sweating, fainting, and hands and feet that are cold to the touch  Call 911 for any of the following:   · You have any of the following signs of a heart attack:      ¨ Squeezing, pressure, or pain in your chest that lasts longer than 5 minutes or returns    ¨ Discomfort or pain in your back, neck, jaw, stomach, or arm     ¨ Trouble breathing    ¨ Nausea or vomiting    ¨ Lightheadedness or a sudden cold sweat, especially with chest pain or trouble breathing    Contact your healthcare provider if:   · You have chest pain that is more frequent, or you have chest pain at rest     · You have questions or concerns about your condition or care  Medicines used to treat CAD:   · Blood pressure medicines  are given to lower your blood pressure  ACE inhibitors help keep your blood vessels relaxed and open to help keep blood flowing into your heart  Beta-blockers keep your heart pumping strongly and regularly so it does not work too hard to get oxygen  · Cholesterol medicines  help lower blood cholesterol levels  · Nitrates , such as nitroglycerin, relax the arteries of your heart so it gets more oxygen  They help to relieve your chest pain  · Antiplatelets , such as aspirin, help prevent blood clots  Take your antiplatelet medicine exactly as directed  These medicines make it more likely for you to bleed or bruise  If you are told to take aspirin, do not take acetaminophen or ibuprofen instead  · Blood thinners  keep clots from forming in your blood  Clots may cause heart attacks, strokes, or death  This medicine makes it more likely for you to bleed or bruise  · Do not take certain medicines without asking your healthcare provider first   These include NSAIDs, herbal or vitamin supplements, or hormones (estrogen or progestin)  Procedures used to treat CAD:   · Angioplasty  may be done to open an artery blocked by plaque  A tube with a balloon on the end is threaded into the blocked artery  Once the tube is in the artery, the balloon is inflated  As the balloon inflates, it presses the plaque against the artery wall to open the artery  A stent may be placed in your artery to keep it open  · Coronary artery bypass graft surgery (CABG)  is open heart surgery   Healthcare providers take arteries or veins from other areas in your body and use them to bypass or go around the blocked arteries of your heart  Cardiac rehabilitation:  Your healthcare provider may recommend that you attend cardiac rehabilitation (rehab)  This is a program run by specialists who will help you safely strengthen your heart and reduce the risk for more heart disease  The plan includes exercise, relaxation, stress management, and heart-healthy nutrition  Healthcare providers will also check to make sure any medicines you are taking are working  Manage CAD to prevent a heart attack:   · Do not smoke  Nicotine and other chemicals in cigarettes and cigars can cause heart and lung damage  Ask your healthcare provider for information if you currently smoke and need help to quit  E-cigarettes or smokeless tobacco still contain nicotine  Talk to your healthcare provider before you use these products  · Exercise regularly  Exercise at least 30 minutes each day, on most days of the week  Exercise helps to lower high cholesterol and high blood pressure  It can also help you maintain a healthy weight  Ask your healthcare provider about the kind of exercise you should do and how to get started  · Maintain a healthy weight  If you are overweight, talk to your healthcare provider about how to lose weight  A weight loss of 10% can improve your heart health  · Eat heart-healthy foods  Include fresh fruits and vegetables in your meal plan  Choose low-fat foods, such as skim or 1% fat milk, low-fat cheese and yogurt, fish, chicken (without skin), and lean meats  Eat two 4-ounce servings of fish high in omega-3 fats each week, such as salmon, fresh tuna, and herring  Do not eat foods that are high in sodium, such as canned foods, potato chips, salty snacks, and cold cuts  Put less table salt on your food  · Limit or do not drink alcohol  A drink of alcohol is 12 ounces of beer, 5 ounces of wine, or 1½ ounces of liquor  · Manage other health conditions    Follow your healthcare provider's advice on how to manage other conditions that can affect your heart health  These include diabetes, high blood pressure, and high cholesterol  You may need to take medicines for these conditions and make other lifestyle changes  · Ask if you should have a flu vaccine  The flu can be dangerous for a person who has CAD  The flu vaccine is available every year in the fall  Follow up with your healthcare provider as directed: You may need to return for other tests  You may also be referred to a cardiac surgeon  Write down your questions so you remember to ask them during your visits  © 2017 [employer] Information is for End User's use only and may not be sold, redistributed or otherwise used for commercial purposes  All illustrations and images included in CareNotes® are the copyrighted property of A D A M , Inc  or [employer]  The above information is an  only  It is not intended as medical advice for individual conditions or treatments  Talk to your doctor, nurse or pharmacist before following any medical regimen to see if it is safe and effective for you  Coronary Intravascular Stent Placement   WHAT YOU SHOULD KNOW:   Coronary intravascular stent placement is a procedure to place a stent in an artery of your heart that has plaque buildup  Plaque is a mixture of fat and cholesterol  A stent is a small mesh tube made of metal that helps keep your artery open  Your caregiver may place a bare metal stent or a drug-eluting stent (MEHDI) in your artery  A MEHDI is coated with medicine that is slowly released and helps prevent more plaque buildup in the area where the stent is placed  The stent remains in your artery for life  You may need more than one stent  AFTER YOU LEAVE:   Medicines: You will be given any of the following:  · Antiplatelets  prevent blood clots from forming  You will need to take aspirin and another type of platelet medicine  Take this medicine daily as directed   Do not stop taking aspirin or other type of antiplatelet medicine  · Nitrates , such as nitroglycerin, relax the arteries of your heart so it gets more oxygen  This medicine helps to relieve chest pain  · Cholesterol medicine  helps decrease the amount of cholesterol in your blood  · Blood pressure medicine  lowers your blood pressure  · Take your medicine as directed  Call your healthcare provider if you think your medicine is not helping or if you have side effects  Tell him if you are allergic to any medicine  Keep a list of the medicines, vitamins, and herbs you take  Include the amounts, and when and why you take them  Bring the list or the pill bottles to follow-up visits  Carry your medicine list with you in case of an emergency  Follow up with your cardiologist as directed:  Write down your questions so you remember to ask them during your visits  Activity:   · Avoid unnecessary stair climbing for 48 hours, if a catheter was put in your groin  · Do not place pressure on your arm, hand, or wrist, if the catheter was placed in your wrist  Avoid pushing, pulling, or heavy lifting with that arm  · If you need to cough, support the area where the catheter was inserted with your hand  · Ask your cardiologist how long you need to limit movement and avoid certain activities  · You may feel like resting more after your procedure  Slowly start to do more each day  Rest when you feel it is needed  Wound care:  Ask your cardiologist about how to care for your incision wound  Ask when you can get into a tub, shower, or pool  Do not smoke: If you smoke, it is never too late to quit  Smoking increases your risk for heart disease and stroke  Ask your cardiologist for information if you need help quitting  Cardiac rehab:  Your cardiologist may recommend that you attend cardiac rehabilitation (rehab)   This is a program run by specialists who will help you safely strengthen your heart and reduce the risk of more heart disease  The plan includes exercise, relaxation, stress management, and heart-healthy nutrition  Caregivers will also check to make sure any medicines you are taking are working  Contact your cardiologist if:   · You have a fever or chills  · You have questions or concerns about your condition or care  Seek care immediately or call 911 if:   · Your leg or arm, used for the procedure, becomes numb or turns white or blue  · The area where the catheter was placed is swollen, red, or has pus or foul-smelling fluid coming from it  · Your arm or leg feels warm, tender, and painful  It may look swollen and red  · You start to bleed from your catheter site again  · You have any of the following signs of a heart attack:     ¨ Squeezing, pressure, fullness, or pain in your chest that lasts longer than a few minutes or returns     ¨ Discomfort or pain in your back, neck, jaw, stomach, or arm    ¨ Shortness of breath or breathing problems    ¨ A sudden cold sweat, lightheadedness, dizziness, or nausea, especially with chest pain or trouble breathing    · You have any of the following signs of a stroke:     ¨ Part of your face droops or is numb    ¨ Weakness in an arm or leg    ¨ Confusion or difficulty speaking    ¨ Dizziness, a severe headache, or vision loss  © 2014 [address] [employer] is for End User's use only and may not be sold, redistributed or otherwise used for commercial purposes  All illustrations and images included in CareNotes® are the copyrighted property of A D A M , Inc  or [employer]  The above information is an  only  It is not intended as medical advice for individual conditions or treatments  Talk to your doctor, nurse or pharmacist before following any medical regimen to see if it is safe and effective for you

## 2018-04-05 LAB
BASOPHILS # BLD AUTO: 52 CELLS/UL (ref 0–200)
BASOPHILS NFR BLD AUTO: 0.8 %
BUN SERPL-MCNC: 13 MG/DL (ref 7–25)
BUN/CREAT SERPL: NORMAL (CALC) (ref 6–22)
CALCIUM SERPL-MCNC: 9.4 MG/DL (ref 8.6–10.3)
CHLORIDE SERPL-SCNC: 104 MMOL/L (ref 98–110)
CO2 SERPL-SCNC: 29 MMOL/L (ref 20–31)
CREAT SERPL-MCNC: 0.93 MG/DL (ref 0.7–1.33)
EOSINOPHIL # BLD AUTO: 98 CELLS/UL (ref 15–500)
EOSINOPHIL NFR BLD AUTO: 1.5 %
ERYTHROCYTE [DISTWIDTH] IN BLOOD BY AUTOMATED COUNT: 13.3 % (ref 11–15)
GLUCOSE SERPL-MCNC: 96 MG/DL (ref 65–99)
HCT VFR BLD AUTO: 43.4 % (ref 38.5–50)
HGB BLD-MCNC: 15.4 G/DL (ref 13.2–17.1)
LYMPHOCYTES # BLD AUTO: 2249 CELLS/UL (ref 850–3900)
LYMPHOCYTES NFR BLD AUTO: 34.6 %
MCH RBC QN AUTO: 30.3 PG (ref 27–33)
MCHC RBC AUTO-ENTMCNC: 35.5 G/DL (ref 32–36)
MCV RBC AUTO: 85.3 FL (ref 80–100)
MONOCYTES # BLD AUTO: 533 CELLS/UL (ref 200–950)
MONOCYTES NFR BLD AUTO: 8.2 %
NEUTROPHILS # BLD AUTO: 3569 CELLS/UL (ref 1500–7800)
NEUTROPHILS NFR BLD AUTO: 54.9 %
PLATELET # BLD AUTO: 178 THOUSAND/UL (ref 140–400)
PMV BLD REES-ECKER: 10.2 FL (ref 7.5–12.5)
POTASSIUM SERPL-SCNC: 4.3 MMOL/L (ref 3.5–5.3)
RBC # BLD AUTO: 5.09 MILLION/UL (ref 4.2–5.8)
SL AMB EGFR AFRICAN AMERICAN: 104 ML/MIN/1.73M2
SL AMB EGFR NON AFRICAN AMERICAN: 90 ML/MIN/1.73M2
SODIUM SERPL-SCNC: 139 MMOL/L (ref 135–146)
WBC # BLD AUTO: 6.5 THOUSAND/UL (ref 3.8–10.8)

## 2018-04-20 ENCOUNTER — OFFICE VISIT (OUTPATIENT)
Dept: CARDIOLOGY CLINIC | Facility: CLINIC | Age: 58
End: 2018-04-20
Payer: COMMERCIAL

## 2018-04-20 VITALS
OXYGEN SATURATION: 99 % | HEART RATE: 56 BPM | SYSTOLIC BLOOD PRESSURE: 118 MMHG | HEIGHT: 68 IN | BODY MASS INDEX: 32.74 KG/M2 | WEIGHT: 216 LBS | DIASTOLIC BLOOD PRESSURE: 80 MMHG

## 2018-04-20 DIAGNOSIS — E78.5 DYSLIPIDEMIA, GOAL LDL BELOW 70: ICD-10-CM

## 2018-04-20 DIAGNOSIS — I25.10 CORONARY ARTERY DISEASE INVOLVING NATIVE CORONARY ARTERY OF NATIVE HEART WITHOUT ANGINA PECTORIS: Primary | ICD-10-CM

## 2018-04-20 DIAGNOSIS — I10 HTN (HYPERTENSION), BENIGN: ICD-10-CM

## 2018-04-20 PROCEDURE — 99213 OFFICE O/P EST LOW 20 MIN: CPT | Performed by: INTERNAL MEDICINE

## 2018-04-20 RX ORDER — ATORVASTATIN CALCIUM 40 MG/1
40 TABLET, FILM COATED ORAL
Qty: 30 TABLET | Refills: 3 | Status: SHIPPED | OUTPATIENT
Start: 2018-04-20 | End: 2019-02-22 | Stop reason: SDUPTHER

## 2018-04-20 RX ORDER — CLOPIDOGREL BISULFATE 75 MG/1
75 TABLET ORAL DAILY
Qty: 30 TABLET | Refills: 3 | Status: SHIPPED | OUTPATIENT
Start: 2018-04-20 | End: 2019-02-22 | Stop reason: SDUPTHER

## 2018-04-20 NOTE — PROGRESS NOTES
Cardiology Outpatient Progress Note - Renee Thomas 60 y o  male MRN: [id number]    @ Encounter: [id number]      Patient Active Problem List    Diagnosis Date Noted    Coronary artery disease involving native coronary artery 03/30/2018    ERRONEOUS ENCOUNTER--DISREGARD 03/28/2018    Screening for colon cancer 03/28/2018    Abnormal stress test 03/26/2018    HTN (hypertension), benign 03/22/2018    Dyspnea on exertion 03/22/2018       Assessment:  1  CAD with MEHDI to 90% LAD on 3/29/18  Prompted by Abnormal nuclear stress test with ST depression and reversible perfusion defect in anteroseptum today 3/26/18  12/11/17: normal stress test 12 minutes- SBP up to 210 mmHg  12/11/17: EF: 60%, grade 1 DD, trileaflet aortic valve  Meds: lopressor 25 mg Q12, Plavix 75 mg daily, asa 81 mg daily, lipitor 40 mg daily  2  HTN response on stress test- 210/100 mmHg  3  Dyslipidemia 3/29 , HDL 38, Tri 163- now on atorvastatin    TODAY'S PLAN:  Lipids in 3 months  Goal LDL 50% reduction  Begin exercise again   HPI:   62 yo male presents presents for evaluation as his father had AVR in his 52's  He thinks it may have been due to bicuspid aortic valve  He has noticed SOB on some heavier exertion  He says this has gotten worse  He does run- usually around 3 miles  More recently he runs a mile and he is short of breath  He also does gymnastics  He did recently gain around 20 lbs- he says he should be under 200  He followed up 3/22 for exertional chest pressure  Interval History:   3/29/18 cath: 90% prox LAD with 3 25 x 15 mm MEHDI    No past medical history on file  Review of Systems - Negative except exertional chest pressure    No Known Allergies        Current Outpatient Prescriptions:     aspirin 81 mg chewable tablet, Chew 1 tablet (81 mg total) daily, Disp: , Rfl: 0    atorvastatin (LIPITOR) 40 mg tablet, Take 1 tablet (40 mg total) by mouth daily with dinner, Disp: 30 tablet, Rfl: 6    clopidogrel (PLAVIX) 75 mg tablet, Take 1 tablet (75 mg total) by mouth daily, Disp: 30 tablet, Rfl: 6    metoprolol tartrate (LOPRESSOR) 25 mg tablet, Take 1 tablet (25 mg total) by mouth every 12 (twelve) hours, Disp: 60 tablet, Rfl: 6    nitroglycerin (NITROSTAT) 0 4 mg SL tablet, Place 1 tablet (0 4 mg total) under the tongue every 5 (five) minutes as needed for chest pain, Disp: 90 tablet, Rfl: 6    Social History     Social History    Marital status: Single     Spouse name: N/A    Number of children: N/A    Years of education: N/A     Occupational History    Not on file  Social History Main Topics    Smoking status: Never Smoker    Smokeless tobacco: Never Used    Alcohol use 0 6 - 1 2 oz/week     1 - 2 Glasses of wine per week      Comment: occasional    Drug use: No    Sexual activity: Not on file     Other Topics Concern    Not on file     Social History Narrative    No narrative on file       No family history on file  Physical Exam:    Vitals: Blood pressure 118/80, pulse 56, height 5' 8" (1 727 m), weight 98 kg (216 lb), SpO2 99 %  , Body mass index is 32 84 kg/m² ,   Wt Readings from Last 3 Encounters:   04/20/18 98 kg (216 lb)   03/26/18 96 6 kg (213 lb)   03/26/18 96 6 kg (213 lb)       Physical Exam:    GEN: Renee Thomas appears well, alert and oriented x 3, pleasant and cooperative   HEENT: pupils equal, round, and reactive to light; extraocular muscles intact  NECK: supple, no carotid bruits   HEART: regular rhythm, normal S1 and S2, no murmurs, clicks, gallops or rubs, JVP is    LUNGS: clear to auscultation bilaterally; no wheezes, rales, or rhonchi   ABDOMEN: normal bowel sounds, soft, no tenderness, no distention  EXTREMITIES: peripheral pulses normal; no clubbing, cyanosis, or edema  NEURO: no focal findings   SKIN: normal without suspicious lesions on exposed skin    Labs & Results:    Lab Results   Component Value Date    GLUCOSE 85 03/30/2018    CALCIUM 9 4 04/05/2018     03/30/2018    K 4 1 03/30/2018    CO2 26 03/30/2018     (H) 03/30/2018    BUN 13 04/05/2018    CREATININE 0 93 04/05/2018     Lab Results   Component Value Date    WBC 6 16 03/29/2018    HGB 15 4 04/05/2018    HCT 43 4 04/05/2018    MCV 85 3 04/05/2018     04/05/2018     No results found for: BNP   Lab Results   Component Value Date    CHOL 210 (H) 03/29/2018    CHOL 219 (H) 03/29/2018     Lab Results   Component Value Date    HDL 36 (L) 03/29/2018    HDL 38 (L) 03/29/2018     Lab Results   Component Value Date    LDLCALC 141 (H) 03/29/2018    LDLCALC 148 (H) 03/29/2018     Lab Results   Component Value Date    TRIG 167 (H) 03/29/2018    TRIG 163 (H) 03/29/2018     No components found for: CHOLHDL      EKG personally reviewed by Cherylann Schirmer, DO  Counseling / Coordination of Care  Total floor / unit time spent today 25 minutes  Greater than 50% of total time was spent with the patient and / or family counseling and / or coordination of care  A description of the counseling / coordination of care: 15  Thank you for the opportunity to participate in the care of this patient    FELICIANO BOWER [address] Cinthia Gauthier

## 2018-05-17 ENCOUNTER — TELEPHONE (OUTPATIENT)
Dept: CARDIOLOGY CLINIC | Facility: CLINIC | Age: 58
End: 2018-05-17

## 2018-05-17 NOTE — TELEPHONE ENCOUNTER
Pt called stating he was advised he cannot hold any thinners for upcoming stent placement  He prefers the lithotripsy, but was advised it cannot be done because he cannot hold his medications   the patient asking if he can possibly be bridged for the lithotripsy?

## 2018-05-18 NOTE — TELEPHONE ENCOUNTER
I discussed this with his urologist who was ok with plan of care  No bridging available for asa and plavix  Fresh prox LAD stent 3/29 , can do in 6 months but under 6 months prox lad stent, risk benefit ratio favors stent  Now and possible lithotripsy later

## 2018-05-21 ENCOUNTER — OFFICE VISIT (OUTPATIENT)
Dept: UROLOGY | Facility: MEDICAL CENTER | Age: 58
End: 2018-05-21
Payer: COMMERCIAL

## 2018-05-21 ENCOUNTER — APPOINTMENT (OUTPATIENT)
Dept: RADIOLOGY | Facility: MEDICAL CENTER | Age: 58
End: 2018-05-21
Attending: UROLOGY
Payer: COMMERCIAL

## 2018-05-21 VITALS
HEIGHT: 68 IN | WEIGHT: 218 LBS | SYSTOLIC BLOOD PRESSURE: 138 MMHG | DIASTOLIC BLOOD PRESSURE: 77 MMHG | BODY MASS INDEX: 33.04 KG/M2

## 2018-05-21 DIAGNOSIS — N20.0 CALCULUS OF KIDNEY: ICD-10-CM

## 2018-05-21 DIAGNOSIS — N39.0 URINARY TRACT INFECTION WITHOUT HEMATURIA, SITE UNSPECIFIED: ICD-10-CM

## 2018-05-21 DIAGNOSIS — N20.0 CALCULUS OF KIDNEY: Primary | ICD-10-CM

## 2018-05-21 PROBLEM — N20.1 RIGHT URETERAL CALCULUS: Status: ACTIVE | Noted: 2018-05-21

## 2018-05-21 LAB
SL AMB  POCT GLUCOSE, UA: ABNORMAL
SL AMB LEUKOCYTE ESTERASE,UA: ABNORMAL
SL AMB POCT BILIRUBIN,UA: ABNORMAL
SL AMB POCT BLOOD,UA: ABNORMAL
SL AMB POCT CLARITY,UA: CLEAR
SL AMB POCT COLOR,UA: YELLOW
SL AMB POCT KETONES,UA: ABNORMAL
SL AMB POCT NITRITE,UA: ABNORMAL
SL AMB POCT PH,UA: 7
SL AMB POCT SPECIFIC GRAVITY,UA: 1.01
SL AMB POCT URINE PROTEIN: ABNORMAL
SL AMB POCT UROBILINOGEN: 0.2

## 2018-05-21 PROCEDURE — 87086 URINE CULTURE/COLONY COUNT: CPT | Performed by: UROLOGY

## 2018-05-21 PROCEDURE — 81003 URINALYSIS AUTO W/O SCOPE: CPT | Performed by: UROLOGY

## 2018-05-21 PROCEDURE — 74018 RADEX ABDOMEN 1 VIEW: CPT

## 2018-05-21 PROCEDURE — 99205 OFFICE O/P NEW HI 60 MIN: CPT | Performed by: UROLOGY

## 2018-05-21 RX ORDER — CIPROFLOXACIN 500 MG/1
500 TABLET, FILM COATED ORAL EVERY 12 HOURS
Refills: 0 | Status: ON HOLD | COMMUNITY
Start: 2018-05-13 | End: 2018-05-29 | Stop reason: SDDI

## 2018-05-21 RX ORDER — ONDANSETRON 4 MG/1
TABLET, ORALLY DISINTEGRATING ORAL
Refills: 0 | COMMUNITY
Start: 2018-05-13 | End: 2018-09-14 | Stop reason: HOSPADM

## 2018-05-21 RX ORDER — OXYCODONE HYDROCHLORIDE AND ACETAMINOPHEN 5; 325 MG/1; MG/1
1 TABLET ORAL EVERY 4 HOURS PRN
Refills: 0 | COMMUNITY
Start: 2018-05-13 | End: 2018-09-14 | Stop reason: HOSPADM

## 2018-05-21 RX ORDER — TAMSULOSIN HYDROCHLORIDE 0.4 MG/1
0.4 CAPSULE ORAL DAILY
Refills: 0 | COMMUNITY
Start: 2018-05-13 | End: 2018-09-14 | Stop reason: HOSPADM

## 2018-05-21 NOTE — PROGRESS NOTES
Assessment/Plan:  1  Although he has no pain, stone has been present for several weeks now and should be taking care of     2   I told the patient I will do ureteroscopy, laser, stent while on full-dose anticoagulation  There is a higher risk of bleeding, but  it is an acceptable risk, to protect his kidney and avoid having a stent for 6-12 months  Other potential complications discussed as well  3   Procedure high risk due to the above issues, coronary disease  No problem-specific Assessment & Plan notes found for this encounter  Diagnoses and all orders for this visit:    Calculus of kidney  -     POCT urine dip auto non-scope  -     XR abdomen 1 view kub; Future  -     Cancel: Urine culture; Future  -     Urine culture; Future  -     Urine culture    Urinary tract infection without hematuria, site unspecified  -     Cancel: Urine culture; Future  -     Urine culture; Future  -     Urine culture    Other orders  -     ciprofloxacin (CIPRO) 500 mg tablet; Take 500 mg by mouth every 12 (twelve) hours  -     ondansetron (ZOFRAN-ODT) 4 mg disintegrating tablet; TAKE 1 TABLET BY MOUTH THREE TIMES A DAY AS NEEDED FOR NAUSEA  -     oxyCODONE-acetaminophen (PERCOCET) 5-325 mg per tablet; Take 1 tablet by mouth every 4 (four) hours as needed  -     tamsulosin (FLOMAX) 0 4 mg; Take 0 4 mg by mouth daily          Subjective:      Patient ID: Ana Mcdonald is a 60 y o  male  1   9 x 8 mm stone left mid ureter at L3, significant left flank pain which is now resolved  Gross hematuria intermittently before and after that  2   Two months ago had cardiac cath with stents, on Plavix and aspirin    Was told by a urologist in [address] [place] [address] he could not get definitive stone treatment, would have to have a stent in place for 6-12 months before he would do the procedure, after finishing Plavix        The following portions of the patient's history were reviewed and updated as appropriate: allergies, current medications, past family history, past medical history, past social history, past surgical history and problem list     Review of Systems   All other systems reviewed and are negative  Objective:      /77 (BP Location: Left arm, Patient Position: Sitting)   Ht 5' 8" (1 727 m)   Wt 98 9 kg (218 lb)   BMI 33 15 kg/m²          Physical Exam   Constitutional: He is oriented to person, place, and time  He appears well-developed and well-nourished  No distress  HENT:   Head: Normocephalic and atraumatic  Eyes: Conjunctivae are normal    Cardiovascular: Normal rate and regular rhythm  Pulmonary/Chest: Effort normal and breath sounds normal  No respiratory distress  He has no wheezes  Abdominal: Soft  Bowel sounds are normal  He exhibits no distension and no mass  There is no tenderness  Neurological: He is alert and oriented to person, place, and time  Skin: Skin is warm and dry  He is not diaphoretic       KUB x-ray stone visible at L3, no change

## 2018-05-21 NOTE — LETTER
May 21, 2018     Anthony Baldwin DO  [address] [address]    Patient: Lisette Walker   YOB: 1960   Date of Visit: 5/21/2018       Dear Dr Charles Gracia: Thank you for referring Dayana Lino to me for evaluation  Below are my notes for this consultation  If you have questions, please do not hesitate to call me  I look forward to following your patient along with you  Sincerely,        Anabela Encarnacion MD        CC: No Recipients  Anabela Encarnacion MD  5/21/2018  5:04 PM  Sign at close encounter  Assessment/Plan:  1  Although he has no pain, stone has been present for several weeks now and should be taking care of     2   I told the patient I will do ureteroscopy, laser, stent while on full-dose anticoagulation  There is a higher risk of bleeding, but  it is an acceptable risk, to protect his kidney and avoid having a stent for 6-12 months  Other potential complications discussed as well  3   Procedure high risk due to the above issues, coronary disease  No problem-specific Assessment & Plan notes found for this encounter  Diagnoses and all orders for this visit:    Calculus of kidney  -     POCT urine dip auto non-scope  -     XR abdomen 1 view kub; Future  -     Cancel: Urine culture; Future  -     Urine culture; Future  -     Urine culture    Urinary tract infection without hematuria, site unspecified  -     Cancel: Urine culture; Future  -     Urine culture; Future  -     Urine culture    Other orders  -     ciprofloxacin (CIPRO) 500 mg tablet; Take 500 mg by mouth every 12 (twelve) hours  -     ondansetron (ZOFRAN-ODT) 4 mg disintegrating tablet; TAKE 1 TABLET BY MOUTH THREE TIMES A DAY AS NEEDED FOR NAUSEA  -     oxyCODONE-acetaminophen (PERCOCET) 5-325 mg per tablet; Take 1 tablet by mouth every 4 (four) hours as needed  -     tamsulosin (FLOMAX) 0 4 mg;  Take 0 4 mg by mouth daily          Subjective:      Patient ID: Lisette Walker is a 60 y o  male  1   9 x 8 mm stone left mid ureter at L3, significant left flank pain which is now resolved  Gross hematuria intermittently before and after that  2   Two months ago had cardiac cath with stents, on Plavix and aspirin  Was told by a urologist in [address] [place] [address] he could not get definitive stone treatment, would have to have a stent in place for 6-12 months before he would do the procedure, after finishing Plavix        The following portions of the patient's history were reviewed and updated as appropriate: allergies, current medications, past family history, past medical history, past social history, past surgical history and problem list     Review of Systems   All other systems reviewed and are negative  Objective:      /77 (BP Location: Left arm, Patient Position: Sitting)   Ht 5' 8" (1 727 m)   Wt 98 9 kg (218 lb)   BMI 33 15 kg/m²           Physical Exam   Constitutional: He is oriented to person, place, and time  He appears well-developed and well-nourished  No distress  HENT:   Head: Normocephalic and atraumatic  Eyes: Conjunctivae are normal    Cardiovascular: Normal rate and regular rhythm  Pulmonary/Chest: Effort normal and breath sounds normal  No respiratory distress  He has no wheezes  Abdominal: Soft  Bowel sounds are normal  He exhibits no distension and no mass  There is no tenderness  Neurological: He is alert and oriented to person, place, and time  Skin: Skin is warm and dry  He is not diaphoretic       KUB x-ray stone visible at L3, no change

## 2018-05-22 LAB — BACTERIA UR CULT: NORMAL

## 2018-05-24 NOTE — H&P
HISTORY AND PHYSICAL  ? ? Patient Name: Aissatou Chavis  Patient MRN: [id number]  Attending Provider: Gayle Jacob MD  Service: Urology  Chief Complaint    Left mid ureter 9 mm stone       HPI   Aissatou Chavis is a 60 y o  male with  hematuria and flank pain some weeks ago, asymptomatic since that time  CT scan showed above stone with mild hydronephrosis, this was done in [address] [place] [address]  He was told by another urologist  in [address] [place] [address] that he could not have definitive stone scope with  laser due to being on blood thinner recently  He had an MI just three weeks ago, and is now on aspirin luis Plavix  risk of complications  I  have  told  him  that  it is possible to do ureteroscopy with laser with  acceptable risk of complications  He knows he may have bleeding than the average case, but we can deal with that and it is safe to go ahead with the procedure  I plan  cysto, left ureteroscopy, laser fragmentation, stent  Potential risks and complications discussed, and informed consent was given by the patient  Medications  Meds/Allergies     No current facility-administered medications for this encounter  Cannot display prior to admission medications because the patient has not been admitted in this contact  No current facility-administered medications for this encounter       Current Outpatient Prescriptions:     aspirin 81 mg chewable tablet, Chew 1 tablet (81 mg total) daily, Disp: , Rfl: 0    atorvastatin (LIPITOR) 40 mg tablet, Take 1 tablet (40 mg total) by mouth daily with dinner, Disp: 30 tablet, Rfl: 3    ciprofloxacin (CIPRO) 500 mg tablet, Take 500 mg by mouth every 12 (twelve) hours, Disp: , Rfl: 0    clopidogrel (PLAVIX) 75 mg tablet, Take 1 tablet (75 mg total) by mouth daily, Disp: 30 tablet, Rfl: 3    metoprolol tartrate (LOPRESSOR) 25 mg tablet, Take 1 tablet (25 mg total) by mouth every 12 (twelve) hours, Disp: 60 tablet, Rfl: 3    nitroglycerin (NITROSTAT) 0 4 mg SL tablet, Place 1 tablet (0 4 mg total) under the tongue every 5 (five) minutes as needed for chest pain, Disp: 90 tablet, Rfl: 6    ondansetron (ZOFRAN-ODT) 4 mg disintegrating tablet, TAKE 1 TABLET BY MOUTH THREE TIMES A DAY AS NEEDED FOR NAUSEA, Disp: , Rfl: 0    oxyCODONE-acetaminophen (PERCOCET) 5-325 mg per tablet, Take 1 tablet by mouth every 4 (four) hours as needed, Disp: , Rfl: 0    tamsulosin (FLOMAX) 0 4 mg, Take 0 4 mg by mouth daily, Disp: , Rfl: 0  Review of Systems  10 point review of systems negative except as noted in HPI  Allergies  No Known Allergies  PMH  Past Medical History:   Diagnosis Date    Angina [facility] rest [facility])     Calculus of kidney     Heart attack ([facility] [address] )     Hypertension     Kidney problem     STONES     Past surgical history  Past Surgical History:   Procedure Laterality Date    LIPOMA RESECTION      TONSILLECTOMY       Social history  Social History   Substance Use Topics    Smoking status: Never Smoker    Smokeless tobacco: Never Used    Alcohol use 0 6 - 1 2 oz/week     1 - 2 Glasses of wine per week      Comment: occasional     ?  Physical Exam  General appearance: alert and oriented, in no acute distress  Head: Normocephalic, without obvious abnormality, atraumatic  Neck: no adenopathy, no carotid bruit, no JVD, supple, symmetrical, trachea midline and thyroid not enlarged, symmetric, no tenderness/mass/nodules  Lungs: clear to auscultation bilaterally  Heart: regular rate and rhythm, S1, S2 normal, no murmur, click, rub or gallop  Abdomen: soft, non-tender; bowel sounds normal; no masses,  no organomegaly  Male genitalia: normal  Extremities: extremities normal, warm and well-perfused; no cyanosis, clubbing, or edema  Pulses: 2+ and symmetric  Lymph nodes: Cervical, supraclavicular, and axillary nodes normal   Neurologic: Grossly normal  Jamal Yañez MD

## 2018-05-25 ENCOUNTER — ANESTHESIA EVENT (OUTPATIENT)
Dept: PERIOP | Facility: HOSPITAL | Age: 58
End: 2018-05-25
Payer: COMMERCIAL

## 2018-05-29 ENCOUNTER — APPOINTMENT (OUTPATIENT)
Dept: RADIOLOGY | Facility: HOSPITAL | Age: 58
End: 2018-05-29
Payer: COMMERCIAL

## 2018-05-29 ENCOUNTER — HOSPITAL ENCOUNTER (OUTPATIENT)
Facility: HOSPITAL | Age: 58
Setting detail: OUTPATIENT SURGERY
Discharge: HOME | End: 2018-05-29
Attending: UROLOGY | Admitting: UROLOGY
Payer: COMMERCIAL

## 2018-05-29 ENCOUNTER — ANESTHESIA (OUTPATIENT)
Dept: PERIOP | Facility: HOSPITAL | Age: 58
End: 2018-05-29
Payer: COMMERCIAL

## 2018-05-29 VITALS
WEIGHT: 215 LBS | HEIGHT: 68 IN | BODY MASS INDEX: 32.58 KG/M2 | TEMPERATURE: 97.8 F | OXYGEN SATURATION: 95 % | RESPIRATION RATE: 18 BRPM | DIASTOLIC BLOOD PRESSURE: 73 MMHG | SYSTOLIC BLOOD PRESSURE: 150 MMHG | HEART RATE: 86 BPM

## 2018-05-29 DIAGNOSIS — N20.1 LEFT URETERAL CALCULUS: ICD-10-CM

## 2018-05-29 DIAGNOSIS — N20.1 RIGHT URETERAL CALCULUS: Primary | ICD-10-CM

## 2018-05-29 PROCEDURE — 52356 CYSTO/URETERO W/LITHOTRIPSY: CPT | Performed by: UROLOGY

## 2018-05-29 PROCEDURE — C1894 INTRO/SHEATH, NON-LASER: HCPCS | Performed by: UROLOGY

## 2018-05-29 PROCEDURE — C1726 CATH, BAL DIL, NON-VASCULAR: HCPCS | Performed by: UROLOGY

## 2018-05-29 PROCEDURE — C2625 STENT, NON-COR, TEM W/DEL SY: HCPCS | Performed by: UROLOGY

## 2018-05-29 PROCEDURE — C1769 GUIDE WIRE: HCPCS | Performed by: UROLOGY

## 2018-05-29 PROCEDURE — 74450 X-RAY URETHRA/BLADDER: CPT

## 2018-05-29 DEVICE — STENT KWART RETRO INJECT SET 6FR 145CM: Type: IMPLANTABLE DEVICE | Status: FUNCTIONAL

## 2018-05-29 RX ORDER — GLYCOPYRROLATE 0.2 MG/ML
INJECTION INTRAMUSCULAR; INTRAVENOUS AS NEEDED
Status: DISCONTINUED | OUTPATIENT
Start: 2018-05-29 | End: 2018-05-29 | Stop reason: SURG

## 2018-05-29 RX ORDER — METOCLOPRAMIDE HYDROCHLORIDE 5 MG/ML
INJECTION INTRAMUSCULAR; INTRAVENOUS AS NEEDED
Status: DISCONTINUED | OUTPATIENT
Start: 2018-05-29 | End: 2018-05-29 | Stop reason: SURG

## 2018-05-29 RX ORDER — OXYCODONE HYDROCHLORIDE AND ACETAMINOPHEN 5; 325 MG/1; MG/1
2 TABLET ORAL EVERY 4 HOURS PRN
Status: DISCONTINUED | OUTPATIENT
Start: 2018-05-29 | End: 2018-05-29 | Stop reason: HOSPADM

## 2018-05-29 RX ORDER — FENTANYL CITRATE 50 UG/ML
INJECTION, SOLUTION INTRAMUSCULAR; INTRAVENOUS AS NEEDED
Status: DISCONTINUED | OUTPATIENT
Start: 2018-05-29 | End: 2018-05-29 | Stop reason: SURG

## 2018-05-29 RX ORDER — OXYCODONE HYDROCHLORIDE AND ACETAMINOPHEN 5; 325 MG/1; MG/1
1 TABLET ORAL EVERY 4 HOURS PRN
Status: DISCONTINUED | OUTPATIENT
Start: 2018-05-29 | End: 2018-05-29 | Stop reason: HOSPADM

## 2018-05-29 RX ORDER — MIDAZOLAM HYDROCHLORIDE 1 MG/ML
INJECTION INTRAMUSCULAR; INTRAVENOUS AS NEEDED
Status: DISCONTINUED | OUTPATIENT
Start: 2018-05-29 | End: 2018-05-29 | Stop reason: SURG

## 2018-05-29 RX ORDER — CEPHALEXIN 500 MG/1
500 CAPSULE ORAL 3 TIMES DAILY
Qty: 15 CAPSULE | Refills: 0 | Status: SHIPPED | OUTPATIENT
Start: 2018-05-29 | End: 2018-06-03

## 2018-05-29 RX ORDER — LIDOCAINE HYDROCHLORIDE 20 MG/ML
INJECTION, SOLUTION INFILTRATION; PERINEURAL AS NEEDED
Status: DISCONTINUED | OUTPATIENT
Start: 2018-05-29 | End: 2018-05-29

## 2018-05-29 RX ORDER — MAGNESIUM HYDROXIDE 1200 MG/15ML
LIQUID ORAL AS NEEDED
Status: DISCONTINUED | OUTPATIENT
Start: 2018-05-29 | End: 2018-05-29 | Stop reason: HOSPADM

## 2018-05-29 RX ORDER — HYDROCODONE BITARTRATE AND ACETAMINOPHEN 5; 325 MG/1; MG/1
1 TABLET ORAL EVERY 4 HOURS PRN
Qty: 20 TABLET | Refills: 0 | Status: SHIPPED | OUTPATIENT
Start: 2018-05-29 | End: 2018-06-03

## 2018-05-29 RX ORDER — FENTANYL CITRATE/PF 50 MCG/ML
25 SYRINGE (ML) INJECTION
Status: DISCONTINUED | OUTPATIENT
Start: 2018-05-29 | End: 2018-05-29 | Stop reason: HOSPADM

## 2018-05-29 RX ORDER — SODIUM CHLORIDE 9 MG/ML
125 INJECTION, SOLUTION INTRAVENOUS CONTINUOUS
Status: DISCONTINUED | OUTPATIENT
Start: 2018-05-29 | End: 2018-05-29 | Stop reason: HOSPADM

## 2018-05-29 RX ORDER — ONDANSETRON 2 MG/ML
INJECTION INTRAMUSCULAR; INTRAVENOUS AS NEEDED
Status: DISCONTINUED | OUTPATIENT
Start: 2018-05-29 | End: 2018-05-29 | Stop reason: SURG

## 2018-05-29 RX ORDER — MORPHINE SULFATE 10 MG/ML
4 INJECTION, SOLUTION INTRAMUSCULAR; INTRAVENOUS EVERY 2 HOUR PRN
Status: DISCONTINUED | OUTPATIENT
Start: 2018-05-29 | End: 2018-05-29 | Stop reason: HOSPADM

## 2018-05-29 RX ORDER — PROPOFOL 10 MG/ML
INJECTION, EMULSION INTRAVENOUS AS NEEDED
Status: DISCONTINUED | OUTPATIENT
Start: 2018-05-29 | End: 2018-05-29 | Stop reason: SURG

## 2018-05-29 RX ORDER — OXYBUTYNIN CHLORIDE 5 MG/1
TABLET ORAL
Qty: 15 TABLET | Refills: 0 | Status: SHIPPED | OUTPATIENT
Start: 2018-05-29 | End: 2018-09-14 | Stop reason: HOSPADM

## 2018-05-29 RX ORDER — DEXAMETHASONE SODIUM PHOSPHATE 4 MG/ML
INJECTION, SOLUTION INTRA-ARTICULAR; INTRALESIONAL; INTRAMUSCULAR; INTRAVENOUS; SOFT TISSUE AS NEEDED
Status: DISCONTINUED | OUTPATIENT
Start: 2018-05-29 | End: 2018-05-29 | Stop reason: SURG

## 2018-05-29 RX ORDER — ONDANSETRON 2 MG/ML
4 INJECTION INTRAMUSCULAR; INTRAVENOUS ONCE
Status: DISCONTINUED | OUTPATIENT
Start: 2018-05-29 | End: 2018-05-29 | Stop reason: HOSPADM

## 2018-05-29 RX ORDER — ACETAMINOPHEN 325 MG/1
650 TABLET ORAL EVERY 6 HOURS PRN
Status: DISCONTINUED | OUTPATIENT
Start: 2018-05-29 | End: 2018-05-29 | Stop reason: HOSPADM

## 2018-05-29 RX ADMIN — MIDAZOLAM 2 MG: 1 INJECTION INTRAMUSCULAR; INTRAVENOUS at 09:56

## 2018-05-29 RX ADMIN — FENTANYL CITRATE 25 MCG: 50 INJECTION, SOLUTION INTRAMUSCULAR; INTRAVENOUS at 10:27

## 2018-05-29 RX ADMIN — DEXAMETHASONE SODIUM PHOSPHATE 4 MG: 4 INJECTION, SOLUTION INTRAMUSCULAR; INTRAVENOUS at 10:09

## 2018-05-29 RX ADMIN — FENTANYL CITRATE 25 MCG: 50 INJECTION, SOLUTION INTRAMUSCULAR; INTRAVENOUS at 10:09

## 2018-05-29 RX ADMIN — FENTANYL CITRATE 25 MCG: 50 INJECTION, SOLUTION INTRAMUSCULAR; INTRAVENOUS at 10:40

## 2018-05-29 RX ADMIN — Medication 2000 MG: at 10:02

## 2018-05-29 RX ADMIN — FENTANYL CITRATE 25 MCG: 50 INJECTION, SOLUTION INTRAMUSCULAR; INTRAVENOUS at 09:56

## 2018-05-29 RX ADMIN — GLYCOPYRROLATE 0.2 MG: 0.2 INJECTION, SOLUTION INTRAMUSCULAR; INTRAVENOUS at 09:56

## 2018-05-29 RX ADMIN — OXYCODONE HYDROCHLORIDE AND ACETAMINOPHEN 2 TABLET: 5; 325 TABLET ORAL at 12:33

## 2018-05-29 RX ADMIN — FENTANYL CITRATE 25 MCG: 50 INJECTION INTRAMUSCULAR; INTRAVENOUS at 11:26

## 2018-05-29 RX ADMIN — SODIUM CHLORIDE: 0.9 INJECTION, SOLUTION INTRAVENOUS at 10:25

## 2018-05-29 RX ADMIN — METOCLOPRAMIDE HYDROCHLORIDE 10 MG: 5 INJECTION INTRAMUSCULAR; INTRAVENOUS at 09:56

## 2018-05-29 RX ADMIN — FENTANYL CITRATE 25 MCG: 50 INJECTION INTRAMUSCULAR; INTRAVENOUS at 11:32

## 2018-05-29 RX ADMIN — ONDANSETRON HYDROCHLORIDE 4 MG: 2 INJECTION, SOLUTION INTRAVENOUS at 10:34

## 2018-05-29 RX ADMIN — PROPOFOL 200 MG: 10 INJECTION, EMULSION INTRAVENOUS at 10:00

## 2018-05-29 RX ADMIN — LIDOCAINE HYDROCHLORIDE 40 MG: 20 INJECTION, SOLUTION INTRAVENOUS at 10:00

## 2018-05-29 RX ADMIN — SODIUM CHLORIDE 125 ML/HR: 0.9 INJECTION, SOLUTION INTRAVENOUS at 07:25

## 2018-05-29 RX ADMIN — FENTANYL CITRATE 25 MCG: 50 INJECTION, SOLUTION INTRAMUSCULAR; INTRAVENOUS at 10:19

## 2018-05-29 RX ADMIN — FENTANYL CITRATE 25 MCG: 50 INJECTION INTRAMUSCULAR; INTRAVENOUS at 11:21

## 2018-05-29 NOTE — OP NOTE
OPERATIVE REPORT  PATIENT NAME: Emily Anderson    :  1960  MRN: [id number]  Pt Location: [facility] OR ROOM 01    SURGERY DATE: 2018    Surgeon(s) and Role:     * Eric Mcghee MD - Primary    Preop Diagnosis:  Left ureteral calculus [N20 1]    Post-Op Diagnosis Codes:     * Left ureteral calculus [N20 1]    Procedure(s) (LRB):  CYSTOSCOPY URETEROSCOPY, RETROGRADES, LASER LITHOTRIPSY, STENT PLACEMENT (Left)    Specimen(s):  * No specimens in log *    Estimated Blood Loss:   Minimal    Drains:  Ureteral Drain/Stent Left ureter 6 Fr  (Active)   Site Assessment RACQUEL 2018 12:35 PM   Dressing Status Open to air 2018 12:35 PM   Dressing Type Other (Comment) 2018 12:35 PM   Number of days: 0       Anesthesia Type:   General    Operative Indications:  Left ureteral calculus [N20 1]      Operative Findings:  7x 9 mm stone stuck in proximal left ureter    Complications:   None    Procedure and Technique:    PLAN FOR STENT:  Removed by nurse next Monday with string     The patient was brought into the OR, properly identified and positioned on the table  General anesthesia was induced, and he was prepped using betadine solution and draped in lithotomy position  The 22F scope was introduced in the urethra, which showed no strictures  The prostate had mild hyperplasia  The bladder was inspected and had no lesions, stones, or tumors  The left ureteral orifice was identified and 2 wires placed up the ureter  The flexible scope was placed thru a sheath and advanced to stone, in the mid-upper portion of the ureter  The Holmium laser was used on various settings to break up stone into small particles  Care was taken not to laser tissue  All particles were judged small enough to pass around the stent  Some particles flushed back into the kidney, and I chased them down, and lasered them there      The scope was removed from the ureter, and the cystoscope was used to place a 6F stent in the ureter, with the upper coils in the renal pelvis, and the distal coil in the bladder  Retrograde pyelogram on that side confirmed good position and no extravasation of contrast    The patient was awaken from anesthesia and taken to the PACU in good condition        I was present for the entire procedure    Patient Disposition:  PACU     SIGNATURE: Amandeep Richmond MD  DATE: May 29, 2018  TIME: 2:55 PM

## 2018-05-29 NOTE — ANESTHESIA PREPROCEDURE EVALUATION
Review of Systems/Medical History  Patient summary reviewed  Chart reviewed  No history of anesthetic complications     Cardiovascular  EKG reviewed, Hypertension controlled, No past MI , CAD , CAD status: 1VD, Cardiac stents (3/29/18--SLB) < 6 months History of percutaneous transluminal coronary angioplasty, Angina Stable,   Comment: EF=65%  Nuclear stress-->Ischemia  Cathed same day: 90% LAD lesion Aplasty --->0% post Aplasty  Stented  No chest pressure since ,  Pulmonary  Shortness of breath, Sleep apnea (No Dx of ROSEANN but snores) ,        GI/Hepatic  Negative GI/hepatic ROS          Kidney stones,        Endo/Other    Obesity    GYN       Hematology  Negative hematology ROS      Musculoskeletal  Negative musculoskeletal ROS        Neurology  Negative neurology ROS      Psychology   Negative psychology ROS              Physical Exam    Airway    Mallampati score: II  TM Distance: >3 FB  Neck ROM: full     Dental   Comment: Upper implants, No notable dental hx     Cardiovascular  Rhythm: regular, Rate: normal, Cardiovascular exam normal    Pulmonary  Pulmonary exam normal Breath sounds clear to auscultation,     Other Findings        Anesthesia Plan  ASA Score- 3     Anesthesia Type- general with ASA Monitors  Additional Monitors:   Airway Plan:         Plan Factors- Patient instructed to abstain from smoking on day of procedure  Patient did not smoke on day of surgery  Induction- intravenous  Postoperative Plan-     Informed Consent- Anesthetic plan and risks discussed with patient

## 2018-05-29 NOTE — ANESTHESIA POSTPROCEDURE EVALUATION
Post-Op Assessment Note      CV Status:  Stable    Mental Status:  Alert and awake    Hydration Status:  Euvolemic    PONV Controlled:  Controlled    Airway Patency:  Patent    Post Op Vitals Reviewed: Yes          Staff: Anesthesiologist           /82 (05/29/18 1137)    Temp 97 8 °F (36 6 °C) (05/29/18 1137)    Pulse 78 (05/29/18 1137)   Resp 20 (05/29/18 1137)    SpO2 96 % (05/29/18 1137)

## 2018-05-29 NOTE — DISCHARGE INSTRUCTIONS
Cystoscopy   WHAT YOU NEED TO KNOW:   A cystoscopy is a procedure to look inside of your urethra and bladder using a cystoscope  A cystoscope is a small tube with a light and magnifying camera on the end  The procedure is used to diagnose and treat conditions of the bladder, urethra, and prostate  The procedure is also done to remove stones or blood clots from the urethra or bladder  Your healthcare provider may do other tests, such as ureteroscopy, during a cystoscopy  DISCHARGE INSTRUCTIONS:   Call 911 if:   · You suddenly have chest pain or trouble breathing  Seek care immediately if:   · Your urine turns from pink to red, or you have clots in your urine  · You cannot urinate and your bladder feels full  · Your pain or burning becomes worse or lasts longer than 2 days  Contact your healthcare provider or urologist if:   · Your urine stays pink for longer than 3 days  · You urinate less than normal, or still feel like you have to urinate after you use the bathroom  · Your skin is itchy, swollen, or has a new rash  · You have a fever and chills  · You have questions or concerns about your condition or care  Medicines: You may  be given any of the following:  · Antibiotics  help treat or prevent a bacterial infection  · Acetaminophen  decreases pain and fever  It is available without a doctor's order  Ask how much to take and how often to take it  Follow directions  Read the labels of all other medicines you are using to see if they also contain acetaminophen, or ask your doctor or pharmacist  Acetaminophen can cause liver damage if not taken correctly  Do not use more than 4 grams (4,000 milligrams) total of acetaminophen in one day  · Take your medicine as directed  Contact your healthcare provider if you think your medicine is not helping or if you have side effects  Tell him or her if you are allergic to any medicine   Keep a list of the medicines, vitamins, and herbs you take  Include the amounts, and when and why you take them  Bring the list or the pill bottles to follow-up visits  Carry your medicine list with you in case of an emergency  Follow up with your healthcare provider as directed: You may need to have another cystoscopy  Write down your questions so you remember to ask them during your visits  Self-care:   · Drink at least 3 to 4 glasses of water daily for 2 days after your procedure  Do not drink acidic juices such as orange juice and lemonade  Drink water to help prevent blood clots from forming  It can also help decrease the amount of acid in your urine  Acid in your urine may increase the burning feeling when you urinate  · Sit in a warm tub of water  Warm water may relieve pain and bladder spasms  · Do not have sex  until your healthcare provider tells you it is okay  Sex may increase your risk for a urinary tract infection  © 2017 [employer] Information is for End User's use only and may not be sold, redistributed or otherwise used for commercial purposes  All illustrations and images included in CareNotes® are the copyrighted property of [employer]  or [employer]  The above information is an  only  It is not intended as medical advice for individual conditions or treatments  Talk to your doctor, nurse or pharmacist before following any medical regimen to see if it is safe and effective for you  Ureteral Stent Placement   WHAT YOU NEED TO KNOW:   Ureteral stent placement is a procedure to open a blocked or narrow ureter  The ureter is the tube that carries urine from your kidney into your bladder  A stent is a thin hollow plastic tube used to hold your ureter open and allow urine to flow  The stent may stay in for several weeks  DISCHARGE INSTRUCTIONS:   Medicines:   · Pain medicine  may be given to take away or decrease pain   Do not wait until the pain is severe before you take your medicine  · Antibiotics  help prevent infections  Your healthcare provider may prescribe these for you while your stent remains in  · Take your medicine as directed  Contact your healthcare provider if you think your medicine is not helping or if you have side effects  Tell him or her if you are allergic to any medicine  Keep a list of the medicines, vitamins, and herbs you take  Include the amounts, and when and why you take them  Bring the list or the pill bottles to follow-up visits  Carry your medicine list with you in case of an emergency  Follow up with your urologist as directed: You will need regular follow-up visits with your urologist as long as the stent remains in  He will check to make sure the stent is working properly  He may do urine cultures to check for infection  Write down your questions so you remember to ask them during your visits  Self-care:   · Drink liquids  as directed  Ask your healthcare provider how much liquid to drink each day and which liquids are best for you  Fluids such as cranberry or apple juice may be especially helpful to prevent urinary infections  · Return to normal activities  the day after your stent placement or as directed by your healthcare provider  · You may take a shower  the day after your stent placement if your healthcare provider says it is okay  Contact your healthcare provider or urologist if:   · You have a fever or chills  · You feel like you need to urinate often  · You have pain when you urinate or pain around your bladder or kidney  · You see blood in your urine or it looks cloudy  · You have questions or concerns about your condition or care  Seek care immediately or call 911 if:   · You urinate little or not at all  · You have severe pain in your abdomen  © 2017 [employer] Information is for End User's use only and may not be sold, redistributed or otherwise used for commercial purposes   All illustrations and images included in CareNotes® are the copyrighted property of [employer] Inc  or [employer]  The above information is an  only  It is not intended as medical advice for individual conditions or treatments  Talk to your doctor, nurse or pharmacist before following any medical regimen to see if it is safe and effective for you

## 2018-05-30 ENCOUNTER — TELEPHONE (OUTPATIENT)
Dept: UROLOGY | Facility: AMBULATORY SURGERY CENTER | Age: 58
End: 2018-05-30

## 2018-05-30 NOTE — TELEPHONE ENCOUNTER
Pt has appt 06/04/18 for stent pull  Would you like him to remain on the flomax? If so pt needs refill

## 2018-05-30 NOTE — TELEPHONE ENCOUNTER
Pt  Would like to know if he should continue his flowmax and if he should he needs a refill please call

## 2018-05-31 NOTE — TELEPHONE ENCOUNTER
Per Dr Korey Henriquez, he does not need the Flomax anymore  LMOM that he doesn't need to take it anymore

## 2018-05-31 NOTE — TELEPHONE ENCOUNTER
Called pt and answered all questions  Pt needs a refill on Flomax if Dr Chayo Vargas wants him to continue on it  Will forward to Dr Chayo Vargas

## 2018-06-04 ENCOUNTER — CLINICAL SUPPORT (OUTPATIENT)
Dept: UROLOGY | Facility: MEDICAL CENTER | Age: 58
End: 2018-06-04
Payer: COMMERCIAL

## 2018-06-04 VITALS
BODY MASS INDEX: 32.58 KG/M2 | HEIGHT: 68 IN | SYSTOLIC BLOOD PRESSURE: 110 MMHG | WEIGHT: 215 LBS | DIASTOLIC BLOOD PRESSURE: 70 MMHG

## 2018-06-04 DIAGNOSIS — N20.1 RIGHT URETERAL CALCULUS: Primary | ICD-10-CM

## 2018-06-04 PROCEDURE — 99211 OFF/OP EST MAY X REQ PHY/QHP: CPT

## 2018-06-04 PROCEDURE — 82360 CALCULUS ASSAY QUANT: CPT | Performed by: UROLOGY

## 2018-06-04 NOTE — PROGRESS NOTES
Procedures  Patient here for string stent pull  Patient had a few stones that he collected to send out to be tested  Patient placed in supine position and string was carefully removed  Patient instructed to increase fluid and call if he has any problems  Patient to return in 4-6 weeks

## 2018-06-12 LAB
CA PHOS MFR STONE: 10 %
CALCIUM OXALATE DIHYDRATE MFR STONE IR: 10 %
COLOR STONE: NORMAL
COM MFR STONE: 80 %
COMMENT-STONE3: NORMAL
COMPOSITION: NORMAL
LABORATORY COMMENT REPORT: NORMAL
NIDUS STONE QL: NORMAL
PHOTO: NORMAL
SIZE STONE: NORMAL MM
STONE ANALYSIS-IMP: NORMAL
WT STONE: 8.1 MG

## 2018-07-13 ENCOUNTER — TELEPHONE (OUTPATIENT)
Dept: UROLOGY | Facility: MEDICAL CENTER | Age: 58
End: 2018-07-13

## 2018-07-13 NOTE — TELEPHONE ENCOUNTER
LMOM that we did not receive PSA lab result and to contact facility that he had it done at and fax it to us  Fax number was given [phone]

## 2018-07-16 ENCOUNTER — OFFICE VISIT (OUTPATIENT)
Dept: UROLOGY | Facility: MEDICAL CENTER | Age: 58
End: 2018-07-16
Payer: COMMERCIAL

## 2018-07-16 VITALS
SYSTOLIC BLOOD PRESSURE: 108 MMHG | DIASTOLIC BLOOD PRESSURE: 70 MMHG | WEIGHT: 214 LBS | BODY MASS INDEX: 32.43 KG/M2 | HEIGHT: 68 IN

## 2018-07-16 DIAGNOSIS — N40.1 BPH WITH URINARY OBSTRUCTION: ICD-10-CM

## 2018-07-16 DIAGNOSIS — N13.8 BPH WITH URINARY OBSTRUCTION: ICD-10-CM

## 2018-07-16 DIAGNOSIS — N20.0 CALCULUS, KIDNEY: Primary | ICD-10-CM

## 2018-07-16 LAB
SL AMB  POCT GLUCOSE, UA: NORMAL
SL AMB LEUKOCYTE ESTERASE,UA: NORMAL
SL AMB POCT BILIRUBIN,UA: NORMAL
SL AMB POCT BLOOD,UA: NORMAL
SL AMB POCT CLARITY,UA: CLEAR
SL AMB POCT COLOR,UA: YELLOW
SL AMB POCT KETONES,UA: NORMAL
SL AMB POCT NITRITE,UA: NORMAL
SL AMB POCT PH,UA: 5.5
SL AMB POCT SPECIFIC GRAVITY,UA: 1.03
SL AMB POCT URINE PROTEIN: NORMAL
SL AMB POCT UROBILINOGEN: 0.2

## 2018-07-16 PROCEDURE — 99213 OFFICE O/P EST LOW 20 MIN: CPT | Performed by: UROLOGY

## 2018-07-16 PROCEDURE — 81003 URINALYSIS AUTO W/O SCOPE: CPT | Performed by: UROLOGY

## 2018-07-16 NOTE — PROGRESS NOTES
Assessment/Plan:   1  Discuss fluids, oxalate rich foods, other dietary issues    2  Stone risk profile 24 urine    3  We will given the results by phone, and follow-up one year, decide then if we need new x-rays  4   Had only mild BPH on his cystoscopy, PSA quite low at 0 8, he does not any BPH treatment   Diagnoses and all orders for this visit:    Calculus, kidney  -     POCT urine dip auto non-scope  -     Cancel: Stone risk profile  -     Stone risk profile    BPH with urinary obstruction          Subjective:     Patient ID: Rajwinder Alfred is a 60 y o  male  Now seven weeks out from a ureteroscopy for 9 mm upper tract stone  Moderate stent discomfort, but did fine once the stent was out  No bleeding, despite being on full-dose Plavix throughout  This is his second or third stone, loss of discussion about what he can do to prevent them  Review of Systems   All other systems reviewed and are negative  Objective:     Physical Exam   Constitutional: He appears well-developed and well-nourished

## 2018-07-16 NOTE — LETTER
July 16, 2018     Saurabh Romero DO  [address] [place] [address]    Patient: Drake Fields   YOB: 1960   Date of Visit: 7/16/2018       Dear Dr Jordan Fitzpatrick: Thank you for referring Niki Cleary to me for evaluation  Below are my notes for this consultation  If you have questions, please do not hesitate to call me  I look forward to following your patient along with you  Sincerely,        Rome Stewart MD        CC: No Recipients  Rome Stewart MD  7/16/2018  1:52 PM  Sign at close encounter  Assessment/Plan:   1  Discuss fluids, oxalate rich foods, other dietary issues    2  Stone risk profile 24 urine    3  We will given the results by phone, and follow-up one year, decide then if we need new x-rays  4   Had only mild BPH on his cystoscopy, PSA quite low at 0 8, he does not any BPH treatment   Diagnoses and all orders for this visit:    Calculus, kidney  -     POCT urine dip auto non-scope  -     Cancel: Stone risk profile  -     Stone risk profile    BPH with urinary obstruction          Subjective:     Patient ID: Drake Fields is a 60 y o  male  Now seven weeks out from a ureteroscopy for 9 mm upper tract stone  Moderate stent discomfort, but did fine once the stent was out  No bleeding, despite being on full-dose Plavix throughout  This is his second or third stone, loss of discussion about what he can do to prevent them  Review of Systems   All other systems reviewed and are negative  Objective:     Physical Exam   Constitutional: He appears well-developed and well-nourished

## 2018-09-14 ENCOUNTER — OFFICE VISIT (OUTPATIENT)
Dept: CARDIOLOGY CLINIC | Facility: CLINIC | Age: 58
End: 2018-09-14
Payer: COMMERCIAL

## 2018-09-14 VITALS
WEIGHT: 223 LBS | BODY MASS INDEX: 33.91 KG/M2 | SYSTOLIC BLOOD PRESSURE: 118 MMHG | OXYGEN SATURATION: 96 % | HEART RATE: 71 BPM | DIASTOLIC BLOOD PRESSURE: 72 MMHG

## 2018-09-14 DIAGNOSIS — E78.5 DYSLIPIDEMIA, GOAL LDL BELOW 70: ICD-10-CM

## 2018-09-14 DIAGNOSIS — I25.10 CORONARY ARTERY DISEASE INVOLVING NATIVE CORONARY ARTERY OF NATIVE HEART WITHOUT ANGINA PECTORIS: Primary | ICD-10-CM

## 2018-09-14 DIAGNOSIS — I10 HTN (HYPERTENSION), BENIGN: ICD-10-CM

## 2018-09-14 PROCEDURE — 99214 OFFICE O/P EST MOD 30 MIN: CPT | Performed by: INTERNAL MEDICINE

## 2018-09-14 NOTE — PROGRESS NOTES
Cardiology Outpatient Progress Note - Madina Luna 60 y o  male MRN: [id number]    @ Encounter: [id number]      Patient Active Problem List    Diagnosis Date Noted    Calculus, kidney 07/16/2018    BPH with urinary obstruction 07/16/2018    Right ureteral calculus 05/21/2018    Coronary artery disease involving native coronary artery 03/30/2018    ERRONEOUS ENCOUNTER--DISREGARD 03/28/2018    Screening for colon cancer 03/28/2018    Abnormal stress test 03/26/2018    HTN (hypertension), benign 03/22/2018    Dyspnea on exertion 03/22/2018       Assessment:  1  CAD with MEHDI to 90% LAD on 3/29/18  Prompted by Abnormal nuclear stress test with ST depression and reversible perfusion defect in anteroseptum today 3/26/18  12/11/17: normal stress test 12 minutes- SBP up to 210 mmHg  12/11/17: EF: 60%, grade 1 DD, trileaflet aortic valve  Meds: lopressor 25 mg Q12, Plavix 75 mg daily, asa 81 mg daily, lipitor 40 mg daily  2  HTN response on stress test- 210/100 mmHg  3  Dyslipidemia 3/29 , HDL 36, Tri 167- now on atorvastatin 40 mg   # ureteral stone-post lithotripsy    TODAY'S PLAN:  Weight loss  Try physical activity increase  Plavix until March 2019    HPI:   59 yo male presents for follow up of HTN and CAD  His initial presentation was out of his concern that his father had AVR in his 52's  He thinks it may have been due to bicuspid aortic valve  He has noticed SOB on some heavier exertion  He says this has gotten worse  He does run- usually around 3 miles  More recently he runs a mile and he is short of breath  He also does gymnastics  He did recently gain around 20 lbs- he says he should be under 200  He followed up 3/22 for exertional chest pressure    3/29/18 cath: 90% prox LAD with 3 25 x 15 mm MEHDI    Interval History:   Feeling a little more muscles soreness in his legs when he does activity    Past Medical History:   Diagnosis Date    [facility])     Calculus of kidney     Heart [facility])     patient states he has no knowledge of this    Hypertension     Kidney problem     STONES       Review of Systems - Negative except exertional chest pressure    No Known Allergies    Current Outpatient Prescriptions:     aspirin 81 mg chewable tablet, Chew 1 tablet (81 mg total) daily, Disp: , Rfl: 0    atorvastatin (LIPITOR) 40 mg tablet, Take 1 tablet (40 mg total) by mouth daily with dinner, Disp: 30 tablet, Rfl: 3    clopidogrel (PLAVIX) 75 mg tablet, Take 1 tablet (75 mg total) by mouth daily, Disp: 30 tablet, Rfl: 3    metoprolol tartrate (LOPRESSOR) 25 mg tablet, Take 1 tablet (25 mg total) by mouth every 12 (twelve) hours, Disp: 60 tablet, Rfl: 3    nitroglycerin (NITROSTAT) 0 4 mg SL tablet, Place 1 tablet (0 4 mg total) under the tongue every 5 (five) minutes as needed for chest pain (Patient not taking: Reported on 9/14/2018 ), Disp: 90 tablet, Rfl: 6    Social History     Social History    Marital status: Single     Spouse name: N/A    Number of children: N/A    Years of education: N/A     Occupational History    Not on file  Social History Main Topics    Smoking status: Never Smoker    Smokeless tobacco: Never Used    Alcohol use 0 6 - 1 2 oz/week     1 - 2 Glasses of wine per week      Comment: occasional    Drug use: No    Sexual activity: Not on file     Other Topics Concern    Not on file     Social History Narrative    No narrative on file       Family History   Problem Relation Age of Onset    Heart disease Father     Hypertension Father     Nephrolithiasis Father        Physical Exam:    Vitals: Blood pressure 118/72, pulse 71, weight 101 kg (223 lb), SpO2 96 %  , Body mass index is 33 91 kg/m² ,   Wt Readings from Last 3 Encounters:   09/14/18 101 kg (223 lb)   07/16/18 97 1 kg (214 lb)   06/04/18 97 5 kg (215 lb)     Physical Exam:    GEN: Lisette Saltness appears well, alert and oriented x 3, pleasant and cooperative   HEENT: pupils equal, round, and reactive to light; extraocular muscles intact  NECK: supple, no carotid bruits   HEART: regular rhythm, normal S1 and S2, no murmurs, clicks, gallops or rubs, JVP is  down  LUNGS: clear to auscultation bilaterally; no wheezes, rales, or rhonchi   ABDOMEN: normal bowel sounds, soft, no tenderness, no distention  EXTREMITIES: peripheral pulses normal; no clubbing, cyanosis, or edema  NEURO: no focal findings   SKIN: normal without suspicious lesions on exposed skin    Labs & Results:    Lab Results   Component Value Date    CALCIUM 9 4 04/05/2018     03/30/2018    K 4 1 03/30/2018    CO2 29 04/05/2018     04/05/2018    BUN 13 04/05/2018    CREATININE 0 93 04/05/2018     Lab Results   Component Value Date    WBC 6 5 04/05/2018    HGB 15 4 04/05/2018    HCT 43 4 04/05/2018    MCV 85 3 04/05/2018     04/05/2018     No results found for: BNP   No results found for: CHOL  Lab Results   Component Value Date    HDL 36 (L) 03/29/2018    HDL 38 (L) 03/29/2018     Lab Results   Component Value Date    LDLCALC 141 (H) 03/29/2018    LDLCALC 148 (H) 03/29/2018     Lab Results   Component Value Date    TRIG 167 (H) 03/29/2018    TRIG 163 (H) 03/29/2018     No components found for: CHOLHDL      EKG personally reviewed by Maira Botello DO  Counseling / Coordination of Care  Total floor / unit time spent today 25 minutes  Greater than 50% of total time was spent with the patient and / or family counseling and / or coordination of care  A description of the counseling / coordination of care: 15  Thank you for the opportunity to participate in the care of this patient    FELICIANO BOWER [address]ier

## 2018-12-06 LAB
BASOPHILS # BLD AUTO: 41 CELLS/UL (ref 0–200)
BASOPHILS NFR BLD AUTO: 0.7 %
BUN SERPL-MCNC: 15 MG/DL (ref 7–25)
BUN/CREAT SERPL: NORMAL (CALC) (ref 6–22)
CALCIUM SERPL-MCNC: 9.1 MG/DL (ref 8.6–10.3)
CHLORIDE SERPL-SCNC: 105 MMOL/L (ref 98–110)
CHOLEST SERPL-MCNC: 126 MG/DL
CHOLEST/HDLC SERPL: 3.5 (CALC)
CK SERPL-CCNC: 68 U/L (ref 44–196)
CO2 SERPL-SCNC: 29 MMOL/L (ref 20–32)
CREAT SERPL-MCNC: 0.9 MG/DL (ref 0.7–1.33)
EOSINOPHIL # BLD AUTO: 83 CELLS/UL (ref 15–500)
EOSINOPHIL NFR BLD AUTO: 1.4 %
ERYTHROCYTE [DISTWIDTH] IN BLOOD BY AUTOMATED COUNT: 13.5 % (ref 11–15)
GLUCOSE SERPL-MCNC: 96 MG/DL (ref 65–99)
HCT VFR BLD AUTO: 43.5 % (ref 38.5–50)
HDLC SERPL-MCNC: 36 MG/DL
HGB BLD-MCNC: 15.4 G/DL (ref 13.2–17.1)
LDLC SERPL CALC-MCNC: 71 MG/DL (CALC)
LYMPHOCYTES # BLD AUTO: 2036 CELLS/UL (ref 850–3900)
LYMPHOCYTES NFR BLD AUTO: 34.5 %
MCH RBC QN AUTO: 30.6 PG (ref 27–33)
MCHC RBC AUTO-ENTMCNC: 35.4 G/DL (ref 32–36)
MCV RBC AUTO: 86.3 FL (ref 80–100)
MONOCYTES # BLD AUTO: 584 CELLS/UL (ref 200–950)
MONOCYTES NFR BLD AUTO: 9.9 %
NEUTROPHILS # BLD AUTO: 3157 CELLS/UL (ref 1500–7800)
NEUTROPHILS NFR BLD AUTO: 53.5 %
NONHDLC SERPL-MCNC: 90 MG/DL (CALC)
PLATELET # BLD AUTO: 212 THOUSAND/UL (ref 140–400)
PMV BLD REES-ECKER: 9.6 FL (ref 7.5–12.5)
POTASSIUM SERPL-SCNC: 4.2 MMOL/L (ref 3.5–5.3)
RBC # BLD AUTO: 5.04 MILLION/UL (ref 4.2–5.8)
SL AMB EGFR AFRICAN AMERICAN: 108 ML/MIN/1.73M2
SL AMB EGFR NON AFRICAN AMERICAN: 93 ML/MIN/1.73M2
SODIUM SERPL-SCNC: 140 MMOL/L (ref 135–146)
TRIGL SERPL-MCNC: 105 MG/DL
WBC # BLD AUTO: 5.9 THOUSAND/UL (ref 3.8–10.8)

## 2019-02-22 DIAGNOSIS — I25.10 CORONARY ARTERY DISEASE INVOLVING NATIVE CORONARY ARTERY OF NATIVE HEART WITHOUT ANGINA PECTORIS: ICD-10-CM

## 2019-02-22 RX ORDER — CLOPIDOGREL BISULFATE 75 MG/1
TABLET ORAL
Qty: 30 TABLET | Refills: 3 | Status: SHIPPED | OUTPATIENT
Start: 2019-02-22

## 2019-02-22 RX ORDER — ATORVASTATIN CALCIUM 40 MG/1
TABLET, FILM COATED ORAL
Qty: 30 TABLET | Refills: 3 | Status: SHIPPED | OUTPATIENT
Start: 2019-02-22 | End: 2019-03-18 | Stop reason: SDUPTHER

## 2019-03-17 NOTE — PROGRESS NOTES
Cardiology Outpatient Progress Note - Padmini Oliver 60 y o  male MRN: [id number]    @ Encounter: [id number]      Patient Active Problem List    Diagnosis Date Noted    HTN (hypertension), benign 03/22/2018     Priority: High    Calculus, kidney 07/16/2018     Priority: Low    BPH with urinary obstruction 07/16/2018     Priority: Low    Right ureteral calculus 05/21/2018     Priority: Low    Coronary artery disease involving native coronary artery 03/30/2018     Priority: Low    ERRONEOUS ENCOUNTER--DISREGARD 03/28/2018     Priority: Low    Screening for colon cancer 03/28/2018     Priority: Low    Abnormal stress test 03/26/2018     Priority: Low    Dyspnea on exertion 03/22/2018     Priority: Low       Assessment:  1  CAD with MEHDI to 90% LAD on 3/29/18  Prompted by Abnormal nuclear stress test with ST depression and reversible perfusion defect in anteroseptum today 3/26/18  12/11/17: normal stress test 12 minutes- SBP up to 210 mmHg  12/11/17: EF: 60%, grade 1 DD, trileaflet aortic valve  Meds: lopressor 25 mg Q12, Plavix 75 mg daily, asa 81 mg daily, lipitor 40 mg daily  2  HTN response on stress test- 210/100 mmHg  3  Dyslipidemia 12/16/18 LDL 71, HDL 36, Tri 105- now on atorvastatin 40 mg   # ureteral stone-post lithotripsy    TODAY'S PLAN:  Cut atorvastatin to 20 mg   Start exercising again    HPI:   61 yo male presents for follow up of HTN and CAD  His initial presentation was out of his concern that his father had AVR in his 52's  He thinks it may have been due to bicuspid aortic valve  He has noticed SOB on some heavier exertion  He says this has gotten worse  He does run- usually around 3 miles  More recently he runs a mile and he is short of breath  He also does gymnastics  He did recently gain around 20 lbs- he says he should be under 200  He followed up 3/22 for exertional chest pressure    3/29/18 cath: 90% prox LAD with 3 25 x 15 mm MEHDI    Interval History:   Plavix was until March (now) 2019  Lipids on 12/6 LDL from 148 to 71- good improvement  Had [facility] screening labs 2/21: AST 80 , ALT 28    Past Medical History:   Diagnosis Date    Angina at rest [facility])     Calculus of kidney     Heart attack [facility])     patient states he has no knowledge of this    Hypertension     Kidney problem     STONES       Review of Systems - Negative except exertional chest pressure    No Known Allergies    Current Outpatient Medications:     aspirin 81 mg chewable tablet, Chew 1 tablet (81 mg total) daily, Disp: , Rfl: 0    atorvastatin (LIPITOR) 20 mg tablet, Take 1 tablet (20 mg total) by mouth daily with dinner, Disp: 90 tablet, Rfl: 2    clopidogrel (PLAVIX) 75 mg tablet, TAKE 1 TABLET BY MOUTH EVERY DAY, Disp: 30 tablet, Rfl: 3    metoprolol tartrate (LOPRESSOR) 25 mg tablet, TAKE 1 TABLET BY MOUTH EVERY 12 HOURS, Disp: 60 tablet, Rfl: 3    nitroglycerin (NITROSTAT) 0 4 mg SL tablet, Place 1 tablet (0 4 mg total) under the tongue every 5 (five) minutes as needed for chest pain (Patient not taking: Reported on 9/14/2018 ), Disp: 90 tablet, Rfl: 6    Social History     Socioeconomic History    Marital status: Single     Spouse name: Not on file    Number of children: Not on file    Years of education: Not on file    Highest education level: Not on file   Occupational History    Not on file   Social Needs    Financial resource strain: Not on file    Food insecurity:     Worry: Not on file     Inability: Not on file    Transportation needs:     Medical: Not on file     Non-medical: Not on file   Tobacco Use    Smoking status: Never Smoker    Smokeless tobacco: Never Used   Substance and Sexual Activity    Alcohol use:  Yes     Alcohol/week: 0 6 - 1 2 oz     Types: 1 - 2 Glasses of wine per week     Comment: occasional    Drug use: No    Sexual activity: Not on file   Lifestyle    Physical activity:     Days per week: Not on file     Minutes per session: Not on file    Stress: Not on file   Relationships    Social connections:     Talks on phone: Not on file     Gets together: Not on file     Attends [employer] service: Not on file     Active member of club or organization: Not on file     Attends meetings of clubs or organizations: Not on file     Relationship status: Not on file    Intimate partner violence:     Fear of current or ex partner: Not on file     Emotionally abused: Not on file     Physically abused: Not on file     Forced sexual activity: Not on file   Other Topics Concern    Not on file   Social History Narrative    Not on file       Family History   Problem Relation Age of Onset    Heart disease Father     Hypertension Father     Nephrolithiasis Father        Physical Exam:    Vitals: Blood pressure 122/84, pulse 66, height 5' 8" (1 727 m), weight 104 kg (228 lb 3 2 oz), SpO2 97 %  , Body mass index is 34 7 kg/m² ,   Wt Readings from Last 3 Encounters:   03/18/19 104 kg (228 lb 3 2 oz)   09/14/18 101 kg (223 lb)   07/16/18 97 1 kg (214 lb)     Physical Exam:    GEN: Roya Setting "[employer] appears well, alert and oriented x 3, pleasant and cooperative   HEENT: pupils equal, round, and reactive to light; extraocular muscles intact  NECK: supple, no carotid bruits   HEART: regular rhythm, normal S1 and S2, no murmurs, clicks, gallops or rubs, JVP is  down  LUNGS: clear to auscultation bilaterally; no wheezes, rales, or rhonchi   ABDOMEN: normal bowel sounds, soft, no tenderness, no distention  EXTREMITIES: peripheral pulses normal; no clubbing, cyanosis, or edema  NEURO: no focal findings   SKIN: normal without suspicious lesions on exposed skin    Labs & Results:    Lab Results   Component Value Date    CALCIUM 9 1 12/06/2018    K 4 2 12/06/2018    CO2 29 12/06/2018     12/06/2018    BUN 15 12/06/2018    CREATININE 0 84 03/30/2018     Lab Results   Component Value Date    WBC 5 9 12/06/2018    HGB 15 4 12/06/2018    HCT 43 5 12/06/2018    MCV 86 3 12/06/2018  12/06/2018     No results found for: BNP   No results found for: CHOL  Lab Results   Component Value Date    HDL 36 (L) 12/06/2018    HDL 36 (L) 03/29/2018    HDL 38 (L) 03/29/2018     Lab Results   Component Value Date    LDLCALC 141 (H) 03/29/2018    LDLCALC 148 (H) 03/29/2018     Lab Results   Component Value Date    TRIG 105 12/06/2018    TRIG 167 (H) 03/29/2018    TRIG 163 (H) 03/29/2018     No results found for: CHOLHDL      EKG personally reviewed by Eri Dale DO  Counseling / Coordination of Care  Total floor / unit time spent today 25 minutes  Greater than 50% of total time was spent with the patient and / or family counseling and / or coordination of care  A description of the counseling / coordination of care: 15  Thank you for the opportunity to participate in the care of this patient    FELICIANO BOWER [address] Cinthia Gauthier

## 2019-03-18 ENCOUNTER — OFFICE VISIT (OUTPATIENT)
Dept: CARDIOLOGY CLINIC | Facility: CLINIC | Age: 59
End: 2019-03-18
Payer: COMMERCIAL

## 2019-03-18 VITALS
DIASTOLIC BLOOD PRESSURE: 84 MMHG | OXYGEN SATURATION: 97 % | HEART RATE: 66 BPM | BODY MASS INDEX: 34.59 KG/M2 | SYSTOLIC BLOOD PRESSURE: 122 MMHG | WEIGHT: 228.2 LBS | HEIGHT: 68 IN

## 2019-03-18 DIAGNOSIS — I25.10 CAD (CORONARY ARTERY DISEASE): ICD-10-CM

## 2019-03-18 DIAGNOSIS — E78.5 DYSLIPIDEMIA, GOAL LDL BELOW 70: ICD-10-CM

## 2019-03-18 DIAGNOSIS — I25.10 CORONARY ARTERY DISEASE INVOLVING NATIVE CORONARY ARTERY OF NATIVE HEART WITHOUT ANGINA PECTORIS: ICD-10-CM

## 2019-03-18 DIAGNOSIS — I10 HTN (HYPERTENSION), BENIGN: Primary | ICD-10-CM

## 2019-03-18 PROCEDURE — 99214 OFFICE O/P EST MOD 30 MIN: CPT | Performed by: INTERNAL MEDICINE

## 2019-03-18 RX ORDER — ATORVASTATIN CALCIUM 20 MG/1
20 TABLET, FILM COATED ORAL
Qty: 90 TABLET | Refills: 2 | Status: SHIPPED | OUTPATIENT
Start: 2019-03-18 | End: 2019-12-17 | Stop reason: SDUPTHER

## 2019-06-19 DIAGNOSIS — I25.10 CORONARY ARTERY DISEASE INVOLVING NATIVE CORONARY ARTERY OF NATIVE HEART WITHOUT ANGINA PECTORIS: ICD-10-CM

## 2019-10-20 DIAGNOSIS — I25.10 CORONARY ARTERY DISEASE INVOLVING NATIVE CORONARY ARTERY OF NATIVE HEART WITHOUT ANGINA PECTORIS: ICD-10-CM

## 2019-12-17 DIAGNOSIS — I25.10 CORONARY ARTERY DISEASE INVOLVING NATIVE CORONARY ARTERY OF NATIVE HEART WITHOUT ANGINA PECTORIS: ICD-10-CM

## 2019-12-17 RX ORDER — ATORVASTATIN CALCIUM 20 MG/1
TABLET, FILM COATED ORAL
Qty: 30 TABLET | Refills: 8 | Status: SHIPPED | OUTPATIENT
Start: 2019-12-17

## 2020-03-05 DIAGNOSIS — I25.10 CORONARY ARTERY DISEASE INVOLVING NATIVE CORONARY ARTERY OF NATIVE HEART WITHOUT ANGINA PECTORIS: ICD-10-CM

## 2020-07-15 DIAGNOSIS — I25.10 CORONARY ARTERY DISEASE INVOLVING NATIVE CORONARY ARTERY OF NATIVE HEART WITHOUT ANGINA PECTORIS: ICD-10-CM

## (undated) DEVICE — ENDOSCOPIC VALVE WITH ADAPTER.: Brand: SURSEAL® II

## (undated) DEVICE — SHEATH URETERAL ACCESS 10/12FR 45CM PROXIS

## (undated) DEVICE — GUIDEWIRE STRGHT TIP 0.035 IN  SOLO PLUS

## (undated) DEVICE — SURGICAL GOWN, XL SMARTSLEEVE: Brand: CONVERTORS

## (undated) DEVICE — PACK TUR

## (undated) DEVICE — SCD SEQUENTIAL COMPRESSION COMFORT SLEEVE MEDIUM KNEE LENGTH: Brand: KENDALL SCD

## (undated) DEVICE — SPECIMEN CONTAINER STERILE PEEL PACK

## (undated) DEVICE — INFLATION DEVICE 6FR X 4CM

## (undated) DEVICE — 3M™ STERI-STRIP™ COMPOUND BENZOIN TINCTURE 40 BAGS/CARTON 4 CARTONS/CASE C1544: Brand: 3M™ STERI-STRIP™

## (undated) DEVICE — TUBING SUCTION 5MM X 12 FT

## (undated) DEVICE — INVIEW CLEAR LEGGINGS: Brand: CONVERTORS

## (undated) DEVICE — 3000CC GUARDIAN II: Brand: GUARDIAN

## (undated) DEVICE — GLOVE SRG BIOGEL 7.5

## (undated) DEVICE — LASER FIBER HOLMIUM 272MICRON

## (undated) DEVICE — LUBRICANT SURGILUBE TUBE 4 OZ  FLIP TOP

## (undated) DEVICE — UROCATCH BAG